# Patient Record
Sex: FEMALE | Race: WHITE | NOT HISPANIC OR LATINO | URBAN - METROPOLITAN AREA
[De-identification: names, ages, dates, MRNs, and addresses within clinical notes are randomized per-mention and may not be internally consistent; named-entity substitution may affect disease eponyms.]

---

## 2017-03-01 ENCOUNTER — OFFICE VISIT (OUTPATIENT)
Dept: PEDIATRICS | Facility: CLINIC | Age: 47
End: 2017-03-01
Payer: COMMERCIAL

## 2017-03-01 ENCOUNTER — RADIANT APPOINTMENT (OUTPATIENT)
Dept: GENERAL RADIOLOGY | Facility: CLINIC | Age: 47
End: 2017-03-01
Attending: INTERNAL MEDICINE
Payer: COMMERCIAL

## 2017-03-01 VITALS
BODY MASS INDEX: 29.38 KG/M2 | HEIGHT: 63 IN | HEART RATE: 74 BPM | OXYGEN SATURATION: 98 % | SYSTOLIC BLOOD PRESSURE: 116 MMHG | WEIGHT: 165.8 LBS | DIASTOLIC BLOOD PRESSURE: 68 MMHG | TEMPERATURE: 98.8 F

## 2017-03-01 DIAGNOSIS — M25.561 ACUTE PAIN OF RIGHT KNEE: Primary | ICD-10-CM

## 2017-03-01 PROCEDURE — 99213 OFFICE O/P EST LOW 20 MIN: CPT | Mod: GE | Performed by: INTERNAL MEDICINE

## 2017-03-01 PROCEDURE — 73562 X-RAY EXAM OF KNEE 3: CPT | Mod: RT

## 2017-03-01 NOTE — PATIENT INSTRUCTIONS
- recommend taking either Aleve 250-500 mg every 12 hours or ibuprofen 600 mg every 6 hours while awake with food  - during a pain flare up, consider taking either of the above regimens for 2-3 days scheduled, then return to an as needed basis per the directions on the bottle label  - would also be okay to use tylenol as needed (do not exceed 3,000 mg per day)  - Could also try alternating tylenol and ibuprofen throughout the day  - A typical regimen would include 600 mg ibuprofen, followed by 625 mg tylenol 3 hours later, followed by repeating the same dose of ibuprofen again 3 hours after tylenol (an so on throughout the day, without exceeding the maximum allowed per day as noted above) while awake  - example: 6AM (600 mg ibuprofen), 9AM (625 mg tylenol), 12PM (600 mg ibuprofen), 3PM (625 mg tylenol), 6PM (600 mg ibuprofen), 9PM (625 mg tylenol)   - consider using pain creams such as Icy Hot, capsaicin or Bio-freeze to the affected areas as needed  - if you do not like the texture of creams, most of these are also available in patch form  - ice and heat to the affected area as needed can also provide relief  - you were referred to sports medicine. To schedule an appointment, see below.   - call your doctor or return to clinic sooner if you symptoms worsen. Also call a doctor with new or spreading numbness/tingling on the right leg, foot drop, new redness and swelling of the knee with fever and chills.  - call with questions  Knee Pain  Knee pain is very common. It s especially common in active people who put a lot of pressure on their knees, like runners. It affects women more often than men.  Your kneecap (patella) is a thick, round bone. It covers and protects the front portion of your knee joint. It moves along a groove in your thighbone (femur) as part of the patellofemoral joint. A layer of cartilage surrounds the underside of your kneecap. This layer protects it from grinding against your femur.  When this  cartilage softens and breaks down, it can cause knee pain. This is partly because of repetitive stress. The stress irritates the lining of the joint. This causes pain in the underlying bone.  What causes knee pain?  Many things can cause knee pain. You may have more than one cause. Some of these include:    Overuse of the knee joint    The kneecap doesn t line up with the tissue around it    Damage to small nerves in the area    Damage to the ligament-like structure that holds the kneecap in place (retinaculum)    Breakdown of the bone under the cartilage    Swelling in the soft tissues around the kneecap    Injury  You might be more likely to have knee pain if you:    Exercise a lot    Recently increased the intensity of your workouts    Have a body mass index (BMI) greater than 25    Have poor alignment of your kneecap    Walk with your feet turned overly outward or inward    Have weakness in surrounding muscle groups (inner quad or hip adductor muscles)    Have too much tightness in surrounding muscle groups (hamstrings or iliotibial band)    Have a recent history of injury to the area    Are female  Symptoms of knee pain  This type of knee pain is a dull, aching pain in the front of the knee in the area under and around the kneecap. This pain may start quickly or slowly. Your pain might be worse when you squat, run, or sit for a long time. You might also sometimes feel like your knee is giving out. You may have symptoms in one or both of your knees.  Diagnosing knee pain  Your health care provider will ask about your medical history and your symptoms. Be sure to describe any activities that make your knee pain worse. He or she will look at your knee. This will include tests of your range of motion, strength, and areas of pain of your knee. Your knee alignment will be checked.  Your health care provider will need to rule out other causes of your knee pain, such as arthritis. You may need an imaging test, such as  an X-ray or MRI.  Treatment for knee pain  Treatments that can help ease your symptoms may include:    Avoiding activities for a while that make your pain worse, returning to activity over time    Icing the outside of your knee when it causes you pain    Taking over-the-counter pain medicine    Wearing a knee brace or taping your knee to support it    Wearing special shoe inserts to help keep your feet in the proper alignment    Doing special exercises to stretch and strengthen the muscles around your hip and your knee  These steps help most people manage knee pain. But some cases of knee pain need to be treated with surgery. You may need surgery right away. Or you may need it later if other treatments don t work. Your health care provider may refer you to an orthopedic surgeon. He or she will talk with you about your choices.  Preventing knee pain  Losing weight and correcting excess muscle tightness or muscle weakness may help lower your risk.  In some cases, you can prevent knee pain. To help prevent a flare-up of knee pain, you do these things:    Regularly do all the exercises your doctor or physical therapist advises    Support your knee as advised by your doctor or physical therapist    Increase training gradually, and ease up on training when needed    Have an expert check your gait for running or other sporting activities    Stretch properly before and after exercise    Replace your running shoes regularly    Lose excess weight     When to call your health care provider  Call your health care provider right away if:    Your symptoms don t get better after a few weeks of treatment    You have any new symptoms        5103-9654 [employer]. [address] [place] [address], [place] [address]. All rights reserved. This information is not intended as a substitute for professional medical care. Always follow your healthcare professional's instructions.        Reducing Knee Pain and Swelling    Many treatments  can help reduce pain and swelling in your knee. Your healthcare provider or physical therapist may suggest one or more of the following treatments:    Icing your knee helps reduce swelling. You may be asked to ice your knee once a day or more. Apply ice for about 15 to 20 minutes at a time, with at least 40 minutes between sessions. Always keep a towel between the ice and your skin.     Keeping your leg raised above your heart helps excess fluid flow out of your knee joint. This reduces swelling.    Compression means wrapping an elastic bandage or neoprene sleeve snugly around your knees. This keeps fluid from collecting in your knee joint.    Electrical stimulation, done by a physical therapist or , can help reduce excess fluid in your knee joint.    Anti-inflammatory medicines may be prescribed by your healthcare provider. You may take pills or receive injections in your knee.    Isometric (sandeep) exercises strengthen the muscles that support your knee joint. They also help reduce excess fluid in your knee.    Massage helps fluid drain away from your knee.    1144-7166 [employer]. [address] [place] [address], [place] [address]. All rights reserved. This information is not intended as a substitute for professional medical care. Always follow your healthcare professional's instructions.

## 2017-03-01 NOTE — PROGRESS NOTES
"  SUBJECTIVE:                                                    Katrin Fitzpatrick is a 46 year old female who presents to clinic today for the following health issues:    Patient presents with right knee pain. She originally injured in Nov 2016, when she tripped on a mound of snow. She had medial knee swelling at the time that mostly resolved with conservative treatment. However, in Jan 2017, she slipped on the ice while walking her dog and \"twisted her knee.\" Denies immediate joint swelling, clicking, popping or locking. She did have some delayed swelling. Describes the right knee pain as a dull throb at rest, along with an \"annoying\" pulling sensation under the knee cap and going back behind the knee. Pain is worse with prolonged sitting or standing, walking, climbing stairs, overuse or cold weather. Pain is better with ibuprofen, aleve, rest, ice, elevation. However, the pain has been persisting since, not getting any better. She now has new numbness in the right lateral shin, going down to the \"last 3 toes.\" There is also \"pressure\" beneath the knee cap. Denies joint swelling, erythema, warmth, fevers, chills, new weakness, radicular pain, foot drop, bruising. No lower extremity edema, recent travel or risks for clot. Hx of gestational diabetes. Denies hx of peripheral neuropathy.     Patient Active Problem List   Diagnosis     NO ACTIVE PROBLEMS     Encounter for supervision of other normal pregnancy     High-risk pregnancy, elderly multigravida     Abnormal maternal QUAD screen     Diabetes mellitus during pregnancy, antepartum     CARDIOVASCULAR SCREENING; LDL GOAL LESS THAN 160     Placental abnormality     Past Surgical History   Procedure Laterality Date     No history of surgery       Hc tooth extraction w/forcep       Laparoscopic hysterectomy total, salpingectomy bilateral Bilateral 12/22/2015     Procedure: LAPAROSCOPIC HYSTERECTOMY TOTAL, SALPINGECTOMY BILATERAL;  Surgeon: Mauri Resendiz MD;  " "Location: [facility] OR     Cystoscopy N/A 12/22/2015     Procedure: CYSTOSCOPY;  Surgeon: Mauri Resendiz MD;  Location: [facility] OR       Social History   Substance Use Topics     Smoking status: Never Smoker     Smokeless tobacco: Not on file     Alcohol use Yes      Comment: four drinks twice per month/ not while pregnant     Family History   Problem Relation Age of Onset     CANCER Mother      Breast melonoma and uterine     Hypertension Maternal Grandmother      Hypertension Maternal Grandfather      CEREBROVASCULAR DISEASE Paternal Grandmother      Circulatory Paternal Grandmother      abdominal aortic aneurysm     CANCER Paternal Grandfather      Colon cancer     CANCER Maternal Aunt      Bladder cancer     DIABETES Maternal Uncle      CANCER Maternal Aunt      Breast cancer     C.A.D. Maternal Aunt      C.A.D. Maternal Uncle      C.A.D. Maternal Uncle          No current outpatient prescriptions on file.     Recent Labs   Lab Test  11/30/15   1530  06/03/10   0628  06/01/10   2000 01/11/10  04/06/09   0917   A1C   --    --    --   4.6@  5.5   ALT   --   11 12   --    --    CR   --   0.61  0.62   --    --    GFRESTIMATED   --   >90  >90   --    --    GFRESTBLACK   --   >90  >90   --    --    TSH  1.44   --    --    --    --       Labs reviewed in EPIC    ROS:  Constitutional, cardiac, MSK, neuro and dermatological ROS completed, and negative aside from pertinent findings noted in HPI.    OBJECTIVE:                                                    /68 (BP Location: Right arm, Patient Position: Chair, Cuff Size: Adult Regular)  Pulse 74  Temp 98.8  F (37.1  C) (Tympanic)  Ht 5' 3\" (1.6 m)  Wt 165 lb 12.8 oz (75.2 kg)  LMP 11/29/2015 (Exact Date)  SpO2 98%  BMI 29.37 kg/m2  Body mass index is 29.37 kg/(m^2).  GENERAL: healthy, alert and no distress  EYES: Eyes grossly normal to inspection, conjunctivae and sclerae normal  RESP: lungs clear to auscultation - no rales, rhonchi or wheezes  CV: regular rate and " rhythm, normal S1 S2, no S3 or S4, no murmur, click or rub, no peripheral edema  ABDOMEN: soft and bowel sounds normal  MSK/NEURO: left knee normal. Right knee with mild erythema on lateral aspect, no erythema or warm. No joint line tenderness or effusion. Right knee with full AROM, PROM. Right patellar reflex normal. Dorsi/plantar flexion normal bilaterally. Negative anterior, posterior drawer test right knee. Negative Elsy right knee. Negative valgus, varus stress right knee. Normal strength and tone, mentation intact and speech normal. Gait normal, able to walk on toes and heels.   PSYCH: mentation appears normal, affect normal/bright  SKIN: no suspicious lesions or rashes    Imagin. Right knee 3 view  - formal read pending, no acute fracture noted, possible area of soft tissue swelling on lateral knee c/w exam     ASSESSMENT/PLAN:                                                      (M25.561) Acute pain of right knee  (primary encounter diagnosis)  Comment: Most likely right knee strain 2/2 trauma. Less likely ligamentous or meniscal tear. Very low suspicion for septic joint as there are no fevers, chills or joint line erythema, warm. There is new numbness on the lateral shin extending to toes 3,4,5, however, there is no foot drop or other appreciable weakness per hx or exam. Suspect this is due to inflammation and swelling in the area affecting local nerves vs repetitive pressure to the area, such as frequent leg crossing (pt reports this). Otherwise no red flags. Knee xray without any significant acute pathology requiring emergent intervention on personal review, formal read pending. Will treat conservatively and order PT referral.  Plan:   - XR Knee Right 3 Views, formal read pending. If significant abnormality noted, will referral to appropriate resource for treatment.  - ORTHO  REFERRAL for PT  - continue conservative treatment, including R.I.C.E.    For additional instructions, including  when to RTC, see AVS  Red flag symptoms to seek immediate medical care reviewed wit patient    Pt d/w Dr. Swanson who agrees with plan     Tian Murphy MD  PGY2 [facility]      I discussed this case in depth with Dr. Murphy and agree with the key components of the history, assessment and plan.      Tati Swanson MD  Internal Medicine/Pediatrics

## 2017-03-01 NOTE — MR AVS SNAPSHOT
After Visit Summary   3/1/2017    Katrin Fitzpatrick    MRN: [id number]           Patient Information     Date Of Birth          1970        Visit Information        Provider Department      3/1/2017 9:45 AM Renny Murphy MD [facility]        Today's Diagnoses     Acute pain of right knee    -  1      Care Instructions      - recommend taking either Aleve 250-500 mg every 12 hours or ibuprofen 600 mg every 6 hours while awake with food  - during a pain flare up, consider taking either of the above regimens for 2-3 days scheduled, then return to an as needed basis per the directions on the bottle label  - would also be okay to use tylenol as needed (do not exceed 3,000 mg per day)  - Could also try alternating tylenol and ibuprofen throughout the day  - A typical regimen would include 600 mg ibuprofen, followed by 625 mg tylenol 3 hours later, followed by repeating the same dose of ibuprofen again 3 hours after tylenol (an so on throughout the day, without exceeding the maximum allowed per day as noted above) while awake  - example: 6AM (600 mg ibuprofen), 9AM (625 mg tylenol), 12PM (600 mg ibuprofen), 3PM (625 mg tylenol), 6PM (600 mg ibuprofen), 9PM (625 mg tylenol)   - consider using pain creams such as Icy Hot, capsaicin or Bio-freeze to the affected areas as needed  - if you do not like the texture of creams, most of these are also available in patch form  - ice and heat to the affected area as needed can also provide relief  - you were referred to sports medicine. To schedule an appointment, see below.   - call your doctor or return to clinic sooner if you symptoms worsen. Also call a doctor with new or spreading numbness/tingling on the right leg, foot drop, new redness and swelling of the knee with fever and chills.  - call with questions  Knee Pain  Knee pain is very common. It s especially common in active people who put a lot of pressure on their knees, like runners. It  affects women more often than men.  Your kneecap (patella) is a thick, round bone. It covers and protects the front portion of your knee joint. It moves along a groove in your thighbone (femur) as part of the patellofemoral joint. A layer of cartilage surrounds the underside of your kneecap. This layer protects it from grinding against your femur.  When this cartilage softens and breaks down, it can cause knee pain. This is partly because of repetitive stress. The stress irritates the lining of the joint. This causes pain in the underlying bone.  What causes knee pain?  Many things can cause knee pain. You may have more than one cause. Some of these include:    Overuse of the knee joint    The kneecap doesn t line up with the tissue around it    Damage to small nerves in the area    Damage to the ligament-like structure that holds the kneecap in place (retinaculum)    Breakdown of the bone under the cartilage    Swelling in the soft tissues around the kneecap    Injury  You might be more likely to have knee pain if you:    Exercise a lot    Recently increased the intensity of your workouts    Have a body mass index (BMI) greater than 25    Have poor alignment of your kneecap    Walk with your feet turned overly outward or inward    Have weakness in surrounding muscle groups (inner quad or hip adductor muscles)    Have too much tightness in surrounding muscle groups (hamstrings or iliotibial band)    Have a recent history of injury to the area    Are female  Symptoms of knee pain  This type of knee pain is a dull, aching pain in the front of the knee in the area under and around the kneecap. This pain may start quickly or slowly. Your pain might be worse when you squat, run, or sit for a long time. You might also sometimes feel like your knee is giving out. You may have symptoms in one or both of your knees.  Diagnosing knee pain  Your health care provider will ask about your medical history and your symptoms. Be  sure to describe any activities that make your knee pain worse. He or she will look at your knee. This will include tests of your range of motion, strength, and areas of pain of your knee. Your knee alignment will be checked.  Your health care provider will need to rule out other causes of your knee pain, such as arthritis. You may need an imaging test, such as an X-ray or MRI.  Treatment for knee pain  Treatments that can help ease your symptoms may include:    Avoiding activities for a while that make your pain worse, returning to activity over time    Icing the outside of your knee when it causes you pain    Taking over-the-counter pain medicine    Wearing a knee brace or taping your knee to support it    Wearing special shoe inserts to help keep your feet in the proper alignment    Doing special exercises to stretch and strengthen the muscles around your hip and your knee  These steps help most people manage knee pain. But some cases of knee pain need to be treated with surgery. You may need surgery right away. Or you may need it later if other treatments don t work. Your health care provider may refer you to an orthopedic surgeon. He or she will talk with you about your choices.  Preventing knee pain  Losing weight and correcting excess muscle tightness or muscle weakness may help lower your risk.  In some cases, you can prevent knee pain. To help prevent a flare-up of knee pain, you do these things:    Regularly do all the exercises your doctor or physical therapist advises    Support your knee as advised by your doctor or physical therapist    Increase training gradually, and ease up on training when needed    Have an expert check your gait for running or other sporting activities    Stretch properly before and after exercise    Replace your running shoes regularly    Lose excess weight     When to call your health care provider  Call your health care provider right away if:    Your symptoms don t get better  after a few weeks of treatment    You have any new symptoms        9282-8676 The [employer] system. [address] [place] [address] 81974. All rights reserved. This information is not intended as a substitute for professional medical care. Always follow your healthcare professional's instructions.        Reducing Knee Pain and Swelling    Many treatments can help reduce pain and swelling in your knee. Your healthcare provider or physical therapist may suggest one or more of the following treatments:    Icing your knee helps reduce swelling. You may be asked to ice your knee once a day or more. Apply ice for about 15 to 20 minutes at a time, with at least 40 minutes between sessions. Always keep a towel between the ice and your skin.     Keeping your leg raised above your heart helps excess fluid flow out of your knee joint. This reduces swelling.    Compression means wrapping an elastic bandage or neoprene sleeve snugly around your knees. This keeps fluid from collecting in your knee joint.    Electrical stimulation, done by a physical therapist or , can help reduce excess fluid in your knee joint.    Anti-inflammatory medicines may be prescribed by your healthcare provider. You may take pills or receive injections in your knee.    Isometric (sandeep) exercises strengthen the muscles that support your knee joint. They also help reduce excess fluid in your knee.    Massage helps fluid drain away from your knee.    5877-2339 The [employer] system. [address] [place] [address]157. All rights reserved. This information is not intended as a substitute for professional medical care. Always follow your healthcare professional's instructions.              Follow-ups after your visit        Additional Services     ORTHO  REFERRAL       [facility] is referring you to the Orthopedic  Services at [facility].       The   Representative will assist you in the coordination of your Orthopedic and Musculoskeletal Care as prescribed by your physician.    The [facility] Representative will call you within 1 business day to help schedule your appointment, or you may contact the [facility] Representative at:    All areas ~ [phone]     Type of Referral : Non Surgical. Right knee pain.      Timeframe requested: Within 2 weeks    Coverage of these services is subject to the terms and limitations of your health insurance plan.  Please call member services at your health plan with any benefit or coverage questions.      If X-rays, CT or MRI's have been performed, please contact the facility where they were done to arrange for , prior to your scheduled appointment.  Please bring this referral request to your appointment and present it to your specialist.                  Who to contact     If you have questions or need follow up information about today's clinic visit or your schedule please contact [facility] directly at [phone].  Normal or non-critical lab and imaging results will be communicated to you by MyChart, letter or phone within 4 business days after the clinic has received the results. If you do not hear from us within 7 days, please contact the clinic through [employer] or phone. If you have a critical or abnormal lab result, we will notify you by phone as soon as possible.  Submit refill requests through [employer] or call your pharmacy and they will forward the refill request to us. Please allow 3 business days for your refill to be completed.          Additional Information About Your Visit        [employer] Information     [employer] gives you secure access to your electronic health record. If you see a primary care provider, you can also send messages to your care team and make appointments. If you have questions, please call your primary care clinic.  If you do not have a primary care provider, please call  "[phone] and they will assist you.        Care EveryWhere ID     This is your Care EveryWhere ID. This could be used by other organizations to access your [place] medical records  [id number]        Your Vitals Were     Pulse Temperature Height Last Period Pulse Oximetry BMI (Body Mass Index)    74 98.8  F (37.1  C) (Tympanic) 5' 3\" (1.6 m) 11/29/2015 (Exact Date) 98% 29.37 kg/m2       Blood Pressure from Last 3 Encounters:   03/01/17 116/68   12/27/16 118/70   02/15/16 108/70    Weight from Last 3 Encounters:   03/01/17 165 lb 12.8 oz (75.2 kg)   12/27/16 162 lb (73.5 kg)   02/15/16 157 lb 12.8 oz (71.6 kg)              We Performed the Following     ORTHO  REFERRAL     XR Knee Right 3 Views        Primary Care Provider    None       No address on file        Thank you!     Thank you for choosing [facility]  for your care. Our goal is always to provide you with excellent care. Hearing back from our patients is one way we can continue to improve our services. Please take a few minutes to complete the written survey that you may receive in the mail after your visit with us. Thank you!             Your Updated Medication List - Protect others around you: Learn how to safely use, store and throw away your medicines at www.disposemymeds.org.      Notice  As of 3/1/2017 11:03 AM    You have not been prescribed any medications.      "

## 2017-03-01 NOTE — NURSING NOTE
"Chief Complaint   Patient presents with     Musculoskeletal Problem       Initial /68 (BP Location: Right arm, Patient Position: Chair, Cuff Size: Adult Regular)  Pulse 74  Temp 98.8  F (37.1  C) (Tympanic)  Ht 5' 3\" (1.6 m)  Wt 165 lb 12.8 oz (75.2 kg)  LMP 11/29/2015 (Exact Date)  SpO2 98%  BMI 29.37 kg/m2 Estimated body mass index is 29.37 kg/(m^2) as calculated from the following:    Height as of this encounter: 5' 3\" (1.6 m).    Weight as of this encounter: 165 lb 12.8 oz (75.2 kg).  Medication Reconciliation: justin Shay LPN      "

## 2017-05-16 ENCOUNTER — TRANSFERRED RECORDS (OUTPATIENT)
Dept: HEALTH INFORMATION MANAGEMENT | Facility: CLINIC | Age: 47
End: 2017-05-16

## 2017-06-12 ENCOUNTER — MYC MEDICAL ADVICE (OUTPATIENT)
Dept: OBGYN | Facility: CLINIC | Age: 47
End: 2017-06-12

## 2017-07-03 ENCOUNTER — OFFICE VISIT (OUTPATIENT)
Dept: OBGYN | Facility: CLINIC | Age: 47
End: 2017-07-03
Payer: COMMERCIAL

## 2017-07-03 VITALS
DIASTOLIC BLOOD PRESSURE: 64 MMHG | HEART RATE: 62 BPM | BODY MASS INDEX: 28.42 KG/M2 | SYSTOLIC BLOOD PRESSURE: 96 MMHG | WEIGHT: 160.4 LBS | HEIGHT: 63 IN

## 2017-07-03 DIAGNOSIS — Z00.00 ROUTINE HISTORY AND PHYSICAL EXAMINATION OF ADULT: Primary | ICD-10-CM

## 2017-07-03 LAB
CHOLEST SERPL-MCNC: 207 MG/DL
GLUCOSE SERPL-MCNC: 84 MG/DL (ref 70–99)
HBA1C MFR BLD: 4.8 % (ref 4.3–6)
HDLC SERPL-MCNC: 54 MG/DL
LDLC SERPL CALC-MCNC: 134 MG/DL
NONHDLC SERPL-MCNC: 153 MG/DL
TRIGL SERPL-MCNC: 93 MG/DL

## 2017-07-03 PROCEDURE — 83036 HEMOGLOBIN GLYCOSYLATED A1C: CPT | Performed by: OBSTETRICS & GYNECOLOGY

## 2017-07-03 PROCEDURE — 80061 LIPID PANEL: CPT | Performed by: OBSTETRICS & GYNECOLOGY

## 2017-07-03 PROCEDURE — 36415 COLL VENOUS BLD VENIPUNCTURE: CPT | Performed by: OBSTETRICS & GYNECOLOGY

## 2017-07-03 PROCEDURE — 99396 PREV VISIT EST AGE 40-64: CPT | Performed by: OBSTETRICS & GYNECOLOGY

## 2017-07-03 PROCEDURE — 82947 ASSAY GLUCOSE BLOOD QUANT: CPT | Performed by: OBSTETRICS & GYNECOLOGY

## 2017-07-03 NOTE — NURSING NOTE
"Chief Complaint   Patient presents with     Gyn Exam       Initial BP 96/64  Pulse 62  Ht 5' 3\" (1.6 m)  Wt 160 lb 6.4 oz (72.8 kg)  LMP 2015 (Exact Date)  BMI 28.41 kg/m2 Estimated body mass index is 28.41 kg/(m^2) as calculated from the following:    Height as of this encounter: 5' 3\" (1.6 m).    Weight as of this encounter: 160 lb 6.4 oz (72.8 kg).  BP completed using cuff size: regular        The following HM Due: NONE      The following patient reported/Care Every where data was sent to:  P ABSTRACT QUALITY INITIATIVES [00868]  NA     patient has appointment for today    Vibha ERICKSON               "

## 2017-07-03 NOTE — PROGRESS NOTES
"SUBJECTIVE:  Katrin Fitzpatrick is a 45 year old   ,  female, G:3 P3 woman who presents for annual exam. Patient's last menstrual period was 11/29/2015 (exact date).   Total Laparoscopic hysterectomy    Current contraception: ESSURE  History of abnormal Pap smear: No  Regular self breast exam: Yes  Family history of breast cancer: Yes - Mother. Colon cancer Yes - Uncle at age 50. Ovarian cancer No.  History of abnormal lipids: No        Past Medical History:   Diagnosis Date     Gestational diabetes      Irregular heart beat      NONSPECIFIC MEDICAL HISTORY        Past Surgical History:   Procedure Laterality Date     CYSTOSCOPY N/A 12/22/2015    Procedure: CYSTOSCOPY;  Surgeon: Mauri Resendiz MD;  Location: [facility] OR     HC TOOTH EXTRACTION W/FORCEP       LAPAROSCOPIC HYSTERECTOMY TOTAL, SALPINGECTOMY BILATERAL Bilateral 12/22/2015    Procedure: LAPAROSCOPIC HYSTERECTOMY TOTAL, SALPINGECTOMY BILATERAL;  Surgeon: Mauri Resendiz MD;  Location: [facility] OR     NO HISTORY OF SURGERY         No current outpatient prescriptions on file.     No current facility-administered medications for this visit.      Allergies   Allergen Reactions     Biaxin [Clarithromycin] Nausea and Vomiting     Humulin N [Insulin, Isophane Human, Nph] Itching       Social History   Substance Use Topics     Smoking status: Never Smoker     Smokeless tobacco: Not on file     Alcohol use Yes      Comment: four drinks twice per month/ not while pregnant       Review of Systems    CONSTITUTIONAL:NEGATIVE  EYES: NEGATIVE  ENT/MOUTH: NEGATIVE  RESP: NEGATIVE  CV: NEGATIVE  GI: NEGATIVE  : NEGATIVE  MUSCULOSKELATAL: NEGATIVE  INTEGUMENTARY/SKIN: NEGATIVE  BREAST: NEGATIVE  NEURO: NEGATIVE.      OBJECTIVE:  BP 96/64  Pulse 62  Ht 5' 3\" (1.6 m)  Wt 160 lb 6.4 oz (72.8 kg)  LMP 11/29/2015 (Exact Date)  BMI 28.41 kg/m2  General appearance: Healthy.  Skin: Normal.  Mental Status: cooperative, normal affect, no gross thought process " defects.  Thyroid: Normal to palpation, no enlargement or nodules noted.  Breasts: Symmetric without mass tenderness or discharge.  Axillary nodes negative.  Lungs: Clear to auscultation.   Heart.: Normal rate and rhythm.  No murmurs, clicks or gallops.   Abdomen: BS active. Soft, non-tender, no masses or organomegaly.    Pelvis: normal external genitalia, normal groin lymphatics, normal urethral meatus, normal vaginal mucosa and normal adnexa, no masses or tenderness  Extremities: Normal     ASSESSMENT:  Satisfactory annual gyn exam    PLAN:    1) Pap smear  2) Mammography, lipids at appropriate intervals        PE: reviewed health maintenance including diet, regular exercise and periodic exams.

## 2017-07-03 NOTE — MR AVS SNAPSHOT
"              After Visit Summary   7/3/2017    Katrin Fitzpatrick    MRN: [id number]           Patient Information     Date Of Birth          1970        Visit Information        Provider Department      7/3/2017 2:30 PM Mauri Resendiz MD [facility]        Today's Diagnoses     Routine history and physical examination of adult    -  1       Follow-ups after your visit        Who to contact     If you have questions or need follow up information about today's clinic visit or your schedule please contact [facility] directly at [phone].  Normal or non-critical lab and imaging results will be communicated to you by [employer], letter or phone within 4 business days after the clinic has received the results. If you do not hear from us within 7 days, please contact the clinic through [employer] or phone. If you have a critical or abnormal lab result, we will notify you by phone as soon as possible.  Submit refill requests through [employer] or call your pharmacy and they will forward the refill request to us. Please allow 3 business days for your refill to be completed.          Additional Information About Your Visit        MyChart Information     [employer] gives you secure access to your electronic health record. If you see a primary care provider, you can also send messages to your care team and make appointments. If you have questions, please call your primary care clinic.  If you do not have a primary care provider, please call [phone] and they will assist you.        Care EveryWhere ID     This is your Care EveryWhere ID. This could be used by other organizations to access your [place] medical records  [id number]        Your Vitals Were     Pulse Height Last Period BMI (Body Mass Index)          62 5' 3\" (1.6 m) 11/29/2015 (Exact Date) 28.41 kg/m2         Blood Pressure from Last 3 Encounters:   07/03/17 96/64   06/07/17 132/82   03/01/17 116/68    Weight from Last 3 " Encounters:   07/03/17 160 lb 6.4 oz (72.8 kg)   06/07/17 165 lb (74.8 kg)   03/01/17 165 lb 12.8 oz (75.2 kg)              Today, you had the following     No orders found for display       Primary Care Provider    None       No address on file        Equal Access to Services     NORMADARRYL HUNTERDORITA : Hadii mary ku hadasho Somendozaali, waaxda luqadaha, qaybta kaalmada bobbi, jody kothari. So [facility] [phone].    ATENCIÓN: Si habla español, tiene a bentley disposición servicios gratuitos de asistencia lingüística. Llame al [phone].    We comply with applicable federal civil rights laws and [place] laws. We do not discriminate on the basis of race, color, national origin, age, disability sex, sexual orientation or gender identity.            Thank you!     Thank you for choosing [facility]  for your care. Our goal is always to provide you with excellent care. Hearing back from our patients is one way we can continue to improve our services. Please take a few minutes to complete the written survey that you may receive in the mail after your visit with us. Thank you!             Your Updated Medication List - Protect others around you: Learn how to safely use, store and throw away your medicines at www.disposemymeds.org.      Notice  As of 7/3/2017  2:58 PM    You have not been prescribed any medications.

## 2017-09-11 ENCOUNTER — OFFICE VISIT (OUTPATIENT)
Dept: PEDIATRICS | Facility: CLINIC | Age: 47
End: 2017-09-11
Payer: COMMERCIAL

## 2017-09-11 ENCOUNTER — RADIANT APPOINTMENT (OUTPATIENT)
Dept: GENERAL RADIOLOGY | Facility: CLINIC | Age: 47
End: 2017-09-11
Attending: PHYSICIAN ASSISTANT
Payer: COMMERCIAL

## 2017-09-11 VITALS
HEIGHT: 63 IN | BODY MASS INDEX: 27.45 KG/M2 | WEIGHT: 154.9 LBS | HEART RATE: 71 BPM | TEMPERATURE: 98.9 F | OXYGEN SATURATION: 98 % | DIASTOLIC BLOOD PRESSURE: 60 MMHG | SYSTOLIC BLOOD PRESSURE: 100 MMHG | RESPIRATION RATE: 14 BRPM

## 2017-09-11 DIAGNOSIS — M25.531 RIGHT WRIST PAIN: ICD-10-CM

## 2017-09-11 DIAGNOSIS — S63.501A WRIST SPRAIN, RIGHT, INITIAL ENCOUNTER: Primary | ICD-10-CM

## 2017-09-11 PROCEDURE — 73110 X-RAY EXAM OF WRIST: CPT | Mod: RT

## 2017-09-11 PROCEDURE — 99213 OFFICE O/P EST LOW 20 MIN: CPT | Performed by: PHYSICIAN ASSISTANT

## 2017-09-11 NOTE — NURSING NOTE
"Chief Complaint   Patient presents with     Musculoskeletal Problem       Initial /60 (BP Location: Right arm, Patient Position: Chair, Cuff Size: Adult Regular)  Pulse 71  Temp 98.9  F (37.2  C) (Oral)  Resp 14  Ht 5' 3\" (1.6 m)  Wt 154 lb 14.4 oz (70.3 kg)  LMP 11/29/2015 (Exact Date)  SpO2 98%  BMI 27.44 kg/m2 Estimated body mass index is 27.44 kg/(m^2) as calculated from the following:    Height as of this encounter: 5' 3\" (1.6 m).    Weight as of this encounter: 154 lb 14.4 oz (70.3 kg).  Medication Reconciliation: complete   Azalea Griffin CMA (AAMA)    "

## 2017-09-11 NOTE — PROGRESS NOTES
"  SUBJECTIVE:   Ktarin Fitzpatrick is a 47 year old female who presents to clinic today for the following health issues:    Joint Pain    Onset: ongoing x one year; worsened considerably over the past three days; worse today    Description:   Location: right wrist  Character: Sharp, Stabbing and throbbing    Intensity: severe    Progression of Symptoms: worse    Accompanying Signs & Symptoms:  Other symptoms: swelling; no redness or bruising  Numbness and tingling   Localized over dorsal wrist and now over the palmar thumb     History:   Previous similar pain: YES    Precipitating factors:   Trauma or overuse: no     Alleviating factors:  Improved by: nothing  Therapies Tried and outcome: ace wraps, ice and ibuprofen 800 mg     Right hand dominant.   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     ROS:  ROS otherwise negative    OBJECTIVE:                                                    /60 (BP Location: Right arm, Patient Position: Chair, Cuff Size: Adult Regular)  Pulse 71  Temp 98.9  F (37.2  C) (Oral)  Resp 14  Ht 5' 3\" (1.6 m)  Wt 154 lb 14.4 oz (70.3 kg)  LMP 11/29/2015 (Exact Date)  SpO2 98%  BMI 27.44 kg/m2  Body mass index is 27.44 kg/(m^2).   GENERAL: alert, no distress  Wrist Exam: WRIST: RIGHT  Inspection: swelling over the dorsal wrist  Palpation: Tender: over the ulnar styloid and mid dorsal wrist  Range of Motion: flexion:  decreased, painful, extension:  decreased, painful, Pronation/Supination: painful and unable to fully complete; radial deviation: unable, ulnar deviation: unable  Strength: unable to fully test due to pain  Special tests: negative Tinel's at carpal tunnel.  , negative Phalen's.      ELBOW:  Unable to fully test due to wrist pain    Diagnostic test results:  Right wrist xrays appears NEGATIVE. Radiology review pending.  No results found for this or any previous visit (from the past 24 hour(s)).       ASSESSMENT/PLAN:                                                    (M07.252L) Wrist sprain, right, " initial encounter  (primary encounter diagnosis)  Comment: wear brace, RICE. Follow up with ortho.   Plan: XR Wrist Right G/E 3 Views, ORTHO          REFERRAL, order for DME          See Patient Instructions    Cammie Knight PA-C  [facility]

## 2017-09-11 NOTE — MR AVS SNAPSHOT
After Visit Summary   9/11/2017    Katrin Fitzpatrick    MRN: [id number]           Patient Information     Date Of Birth          1970        Visit Information        Provider Department      9/11/2017 1:30 PM Cammie Knight PA-C [facility]        Today's Diagnoses     Right wrist pain    -  1      Care Instructions                     Wrist Sprain            What is a wrist sprain?   A sprain is an injury to a joint that causes a stretch or tear in a ligament. Ligaments are strong bands of tissue that connect one bone to another. Your wrist is made up of 8 bones that are attached to your hand bones and the bones of your forearm. The wrist joint is covered by a joint capsule and the bones are connected by ligaments.   How does it occur?   A wrist sprain can happen when you fall on your wrist or hand, when you are struck by an object, or during a forced motion of the wrist.   What are the symptoms?   You have pain, swelling, and tenderness in your wrist.   How is it diagnosed?   Your healthcare provider will review your symptoms and examine your wrist. You may have an X-ray to be sure you have not broken any bones in your wrist.   How is it treated?   To treat this condition:   Put an ice pack, gel pack, or package of frozen vegetables, wrapped in a cloth on the wrist every 3 to 4 hours, for up to 20 minutes at a time.   Raise your wrist on a pillow when you sit or lie down.   Take an anti-inflammatory such as ibuprofen, or other medicine as directed by your provider. Nonsteroidal anti-inflammatory medicines (NSAIDs) may cause stomach bleeding and other problems. These risks increase with age. Read the label and take as directed. Unless recommended by your healthcare provider, do not take for more than 10 days.   Wear a splint or cast on your wrist as directed by your provider.   Follow your provider's instructions for doing exercises to help you recover.   Some serious wrist  sprains that involve ligament tears may need surgery.   While you are recovering from your injury you will need to change your sport or activity to one that does not make your condition worse. For example, you may need to run instead of playing basketball.   How long will the effects last?   The length of recovery depends on many factors such as your age and health, and if you have had a previous wrist injury. Recovery time also depends on the severity of the wrist sprain. Pain from a wrist sprain may last several weeks or longer. You need to stop doing the activities that cause pain until your wrist has improved. If you continue doing activities that cause pain, your symptoms will return and it will take longer to recover.   When can I return to my normal activities?   Everyone recovers from an injury at a different rate. Return to your activities depends on how soon your wrist recovers, not by how many days or weeks it has been since your injury has occurred. In general, the longer you have symptoms before you start treatment, the longer it will take to get better. The goal of rehabilitation is to return you to your normal activities as soon as is safely possible. If you return too soon you may worsen your injury.   You may return to your activities when the injured wrist can move normally without pain. Your injured wrist, hand, and forearm need to have the same strength as the uninjured side.   How can I prevent a wrist sprain?   A wrist sprain usually occurs during an accident that is not preventable. However, when you are doing activities such as rollerblading be sure to wear protective wrist guards.           Follow-ups after your visit        Additional Services     ORTHO [facility] REFERRAL       [facility] is referring you to the Orthopedic  Services at [facility].       The  Representative will assist you in the coordination of your Orthopedic and  Musculoskeletal Care as prescribed by your physician.    The  Representative will call you within 1 business day to help schedule your appointment, or you may contact the  Representative at:    All areas ~ [phone]     Type of Referral : Non Surgical       Timeframe requested: 3 - 5 days    Coverage of these services is subject to the terms and limitations of your health insurance plan.  Please call member services at your health plan with any benefit or coverage questions.      If X-rays, CT or MRI's have been performed, please contact the facility where they were done to arrange for , prior to your scheduled appointment.  Please bring this referral request to your appointment and present it to your specialist.                  Who to contact     If you have questions or need follow up information about today's clinic visit or your schedule please contact [facility] directly at [phone].  Normal or non-critical lab and imaging results will be communicated to you by MyChart, letter or phone within 4 business days after the clinic has received the results. If you do not hear from us within 7 days, please contact the clinic through [employer] or phone. If you have a critical or abnormal lab result, we will notify you by phone as soon as possible.  Submit refill requests through [employer] or call your pharmacy and they will forward the refill request to us. Please allow 3 business days for your refill to be completed.          Additional Information About Your Visit        [employer] Information     [employer] gives you secure access to your electronic health record. If you see a primary care provider, you can also send messages to your care team and make appointments. If you have questions, please call your primary care clinic.  If you do not have a primary care provider, please call [phone] and they will assist you.        Care EveryWhere ID     This is your Care EveryWhere  "ID. This could be used by other organizations to access your [place] medical records  [id number]        Your Vitals Were     Pulse Temperature Respirations Height Last Period Pulse Oximetry    71 98.9  F (37.2  C) (Oral) 14 5' 3\" (1.6 m) 11/29/2015 (Exact Date) 98%    BMI (Body Mass Index)                   27.44 kg/m2            Blood Pressure from Last 3 Encounters:   09/11/17 100/60   07/03/17 96/64   06/07/17 132/82    Weight from Last 3 Encounters:   09/11/17 154 lb 14.4 oz (70.3 kg)   07/03/17 160 lb 6.4 oz (72.8 kg)   06/07/17 165 lb (74.8 kg)              We Performed the Following     ORTHO  REFERRAL          Today's Medication Changes          These changes are accurate as of: 9/11/17  2:05 PM.  If you have any questions, ask your nurse or doctor.               Start taking these medicines.        Dose/Directions    order for DME   Used for:  Right wrist pain   Started by:  Cammie Knight PA-C        Equipment being ordered: wrist brace   Quantity:  1 Units   Refills:  0            Where to get your medicines      Some of these will need a paper prescription and others can be bought over the counter.  Ask your nurse if you have questions.     Bring a paper prescription for each of these medications     order for DME                Primary Care Provider Office Phone # Fax #    Mauri Resendiz [phone]       [address] [facility] [address]        Equal Access to Services     DARRYL FULTON AH: Hadii mary ku hadasho Soomaali, waaxda luqadaha, qaybta kaalmada adeegyada, waxjanki dung alfredo . So [facility] [phone].    ATENCIÓN: Si habla español, tiene a bentley disposición servicios gratuitos de asistencia lingüística. Llame al [phone].    We comply with applicable federal civil rights laws and [place] laws. We do not discriminate on the basis of race, color, national origin, age, disability sex, sexual orientation or gender " identity.            Thank you!     Thank you for choosing [facility]  for your care. Our goal is always to provide you with excellent care. Hearing back from our patients is one way we can continue to improve our services. Please take a few minutes to complete the written survey that you may receive in the mail after your visit with us. Thank you!             Your Updated Medication List - Protect others around you: Learn how to safely use, store and throw away your medicines at www.disposemymeds.org.          This list is accurate as of: 9/11/17  2:05 PM.  Always use your most recent med list.                   Brand Name Dispense Instructions for use Diagnosis    order for DME     1 Units    Equipment being ordered: wrist brace    Right wrist pain

## 2017-09-11 NOTE — PATIENT INSTRUCTIONS
Wrist Sprain            What is a wrist sprain?   A sprain is an injury to a joint that causes a stretch or tear in a ligament. Ligaments are strong bands of tissue that connect one bone to another. Your wrist is made up of 8 bones that are attached to your hand bones and the bones of your forearm. The wrist joint is covered by a joint capsule and the bones are connected by ligaments.   How does it occur?   A wrist sprain can happen when you fall on your wrist or hand, when you are struck by an object, or during a forced motion of the wrist.   What are the symptoms?   You have pain, swelling, and tenderness in your wrist.   How is it diagnosed?   Your healthcare provider will review your symptoms and examine your wrist. You may have an X-ray to be sure you have not broken any bones in your wrist.   How is it treated?   To treat this condition:   Put an ice pack, gel pack, or package of frozen vegetables, wrapped in a cloth on the wrist every 3 to 4 hours, for up to 20 minutes at a time.   Raise your wrist on a pillow when you sit or lie down.   Take an anti-inflammatory such as ibuprofen, or other medicine as directed by your provider. Nonsteroidal anti-inflammatory medicines (NSAIDs) may cause stomach bleeding and other problems. These risks increase with age. Read the label and take as directed. Unless recommended by your healthcare provider, do not take for more than 10 days.   Wear a splint or cast on your wrist as directed by your provider.   Follow your provider's instructions for doing exercises to help you recover.   Some serious wrist sprains that involve ligament tears may need surgery.   While you are recovering from your injury you will need to change your sport or activity to one that does not make your condition worse. For example, you may need to run instead of playing basketball.   How long will the effects last?   The length of recovery depends on many factors such as your age and  health, and if you have had a previous wrist injury. Recovery time also depends on the severity of the wrist sprain. Pain from a wrist sprain may last several weeks or longer. You need to stop doing the activities that cause pain until your wrist has improved. If you continue doing activities that cause pain, your symptoms will return and it will take longer to recover.   When can I return to my normal activities?   Everyone recovers from an injury at a different rate. Return to your activities depends on how soon your wrist recovers, not by how many days or weeks it has been since your injury has occurred. In general, the longer you have symptoms before you start treatment, the longer it will take to get better. The goal of rehabilitation is to return you to your normal activities as soon as is safely possible. If you return too soon you may worsen your injury.   You may return to your activities when the injured wrist can move normally without pain. Your injured wrist, hand, and forearm need to have the same strength as the uninjured side.   How can I prevent a wrist sprain?   A wrist sprain usually occurs during an accident that is not preventable. However, when you are doing activities such as rollerblading be sure to wear protective wrist guards.

## 2017-09-14 ENCOUNTER — OFFICE VISIT (OUTPATIENT)
Dept: ORTHOPEDICS | Facility: CLINIC | Age: 47
End: 2017-09-14
Payer: COMMERCIAL

## 2017-09-14 VITALS
DIASTOLIC BLOOD PRESSURE: 78 MMHG | SYSTOLIC BLOOD PRESSURE: 120 MMHG | BODY MASS INDEX: 27.29 KG/M2 | HEIGHT: 63 IN | WEIGHT: 154 LBS

## 2017-09-14 DIAGNOSIS — M77.8 RIGHT WRIST TENDINITIS: Primary | ICD-10-CM

## 2017-09-14 PROCEDURE — 99243 OFF/OP CNSLTJ NEW/EST LOW 30: CPT | Performed by: FAMILY MEDICINE

## 2017-09-14 NOTE — PROGRESS NOTES
"ASSESSMENT & PLAN    ICD-10-CM    1. Right wrist tendinitis M77.8 [facility] PT, HAND, AND CHIROPRACTIC REFERRAL   Hand therapy: [place] [facility] - [phone]  Continue with bracing for now    Follow up if not improved after 4-6 hand therapy sessions  or sooner if needed. Call direct clinic number [[phone]] at any time with questions or concerns.    -----    SUBJECTIVE  Katrin Fitzpatrick is a/an 47 year old  right hand dominant female who is seen in consultation at the request of Cammie RAMIREZ for evaluation of right dorsal wrist pain. The patient is seen with their .    Onset: 9/9/17 increased pain, has been bothersome for over a year. Reports insidious onset without acute precipitating event. She reports that on 9/11/17 she was in a lot of pain and had trouble with motion. She repors today that she is much improved.  Worsened by: rotation, wrist flexion  Better with: wrist brace  Quality: pulling sensation  Pain Scale (maximum/current)/10: 8/10 / 0/10  Treatments tried: ibuprofen and cock up wrist brace - no tneeded since 9/11/17  Orthopedic history: YES - Date: 3rd grade fractured wrist  Relevant surgical history: NO  Patient Social History: works at nurse    Patient's past medical, surgical, social, and family histories were reviewed today and no changes are noted.    REVIEW OF SYSTEMS:  10 point ROS is negative other than symptoms noted above in HPI, Past Medical History or as stated below  Constitutional: NEGATIVE for fever, chills, change in weight  Skin: NEGATIVE for worrisome rashes, moles or lesions  GI/: NEGATIVE for bowel or bladder changes  Neuro: NEGATIVE for weakness, dizziness or paresthesias    OBJECTIVE:  /78  Ht 5' 3\" (1.6 m)  Wt 154 lb (69.9 kg)  LMP 11/29/2015 (Exact Date)  BMI 27.28 kg/m2   General: healthy, alert and in no distress  HEENT: no scleral icterus or conjunctival erythema  Skin: no suspicious lesions or rash. No jaundice.  CV: regular rhythm " by palpation  Resp: normal respiratory effort without conversational dyspnea   Psych: normal mood and affect  Gait: normal steady gait with appropriate coordination and balance  Neuro: Normal sensory exam of bilateral hands. Normal 2 pt discrimination.   MSK:  RIGHT WRIST  Inspection:   Trace swelling ulnar/dorsal wrist  Palpation:    Tender about the ECU > TFCC. Remainder of bony and ligamentous line marks are nontender.   Range of Motion:    Full in all planes, mildly painful with ulnar deviation  Strength:     strength full flexion full extension full radial deviation full ulnar deviation limited by pain. Normal pinch strength.  Special Tests:    Positive: painful with testing of ECU,     Negative: nontender with TFCC grind    Independent visualization of the below image:   RIGHT WRIST 3 VIEWS   9/11/2017 1:54 PM      HISTORY: Right wrist pain.     COMPARISON: None.         IMPRESSION: Unremarkable examination. No evidence for acute fracture  or dislocation in the right wrist.     GIDEON ARIAS MD      Patient's conditions were thoroughly discussed during today's visit with greater than 50% of the visit spent counseling the patient with total time spent face-to-face with the patient being 15 minutes.    Altaf Purvis DO [facility] [place]

## 2017-09-14 NOTE — PATIENT INSTRUCTIONS
Thank you for allowing us to participate in your care today.  Please find below your visit diagnosis and the plan going forward.    1. Right wrist tendinitis      Hand therapy: [place] [facility] - [phone]  Continue with bracing for now    Follow up if not improved after 4-6 hand therapy sessions  or sooner if needed. Call direct clinic number [[phone]] at any time with questions or concerns.    Altaf Purvis DO CAM  [facility]  Website: [email or web address]  Twitter: [email or web address]

## 2017-09-14 NOTE — NURSING NOTE
"Chief Complaint   Patient presents with     Musculoskeletal Problem     right wrist pain       Initial /78  Ht 5' 3\" (1.6 m)  Wt 154 lb (69.9 kg)  LMP 11/29/2015 (Exact Date)  BMI 27.28 kg/m2 Estimated body mass index is 27.28 kg/(m^2) as calculated from the following:    Height as of this encounter: 5' 3\" (1.6 m).    Weight as of this encounter: 154 lb (69.9 kg).  Medication Reconciliation: complete     Aditya Chan, ATC    "

## 2017-09-14 NOTE — MR AVS SNAPSHOT
After Visit Summary   9/14/2017    Katrin Fitzpatrick    MRN: [id number]           Patient Information     Date Of Birth          1970        Visit Information        Provider Department      9/14/2017 11:00 AM Altaf Purvis DO [facility]        Today's Diagnoses     Right wrist tendinitis    -  1      Care Instructions    Thank you for allowing us to participate in your care today.  Please find below your visit diagnosis and the plan going forward.    1. Right wrist tendinitis      Hand therapy: [place] [facility] - [phone]  Continue with bracing for now    Follow up if not improved after 4-6 hand therapy sessions  or sooner if needed. Call direct clinic number [[phone]] at any time with questions or concerns.    Altaf Purvis DO [facility]  Website: [email or web address]  Twitter: [email or web address]            Follow-ups after your visit        Additional Services     [facility] PT, HAND, AND CHIROPRACTIC REFERRAL       **This order will print in the [facility] Scheduling Office**    Physical Therapy, Hand Therapy and Chiropractic Care are available through:    *[place] [facility]  *[facility]  *[facility]    Call one number to schedule at any of the above locations: [phone].    Your provider has referred you to: Hand Therapy    Indication/Reason for Referral: Ulnar sided wrist pain (ECU/TFCC)  Onset of Illness: see chart  Therapy Orders: Evaluate and Treat  Special Programs: None  Special Request: Please provide custom orthosis as appropriate    Additional Comments for the Therapist or Chiropractor:     Please be aware that coverage of these services is subject to the terms and limitations of your health insurance plan.  Call member services at your health plan with any benefit or coverage questions.      Please bring the following to your appointment:    *Your personal  "calendar for scheduling future appointments  *Comfortable clothing                  Who to contact     If you have questions or need follow up information about today's clinic visit or your schedule please contact [facility] directly at [phone].  Normal or non-critical lab and imaging results will be communicated to you by MyChart, letter or phone within 4 business days after the clinic has received the results. If you do not hear from us within 7 days, please contact the clinic through MyChart or phone. If you have a critical or abnormal lab result, we will notify you by phone as soon as possible.  Submit refill requests through [employer] or call your pharmacy and they will forward the refill request to us. Please allow 3 business days for your refill to be completed.          Additional Information About Your Visit        [employer] Information     [employer] gives you secure access to your electronic health record. If you see a primary care provider, you can also send messages to your care team and make appointments. If you have questions, please call your primary care clinic.  If you do not have a primary care provider, please call [phone] and they will assist you.        Care EveryWhere ID     This is your Care EveryWhere ID. This could be used by other organizations to access your [facility] medical records  [id number]        Your Vitals Were     Height Last Period BMI (Body Mass Index)             5' 3\" (1.6 m) 11/29/2015 (Exact Date) 27.28 kg/m2          Blood Pressure from Last 3 Encounters:   09/14/17 120/78   09/11/17 100/60   07/03/17 96/64    Weight from Last 3 Encounters:   09/14/17 154 lb (69.9 kg)   09/11/17 154 lb 14.4 oz (70.3 kg)   07/03/17 160 lb 6.4 oz (72.8 kg)              We Performed the Following     [facility] PT, HAND, AND CHIROPRACTIC REFERRAL        Primary Care Provider Office Phone # Fax #    Mauri Resendiz [phone]       [address] " [facility] [address]        Equal Access to Services     [facility] : Hadii aad ku hadkjstella Emileeali, waessieda luqadaha, qaybta brysonjuanjody barrarelisingrid kothari. So [facility] [phone].    ATENCIÓN: Si habla español, tiene a bentley disposición servicios gratuitos de asistencia lingüística. Llame al [phone].    We comply with applicable federal civil rights laws and [place] laws. We do not discriminate on the basis of race, color, national origin, age, disability sex, sexual orientation or gender identity.            Thank you!     Thank you for choosing [facility]  for your care. Our goal is always to provide you with excellent care. Hearing back from our patients is one way we can continue to improve our services. Please take a few minutes to complete the written survey that you may receive in the mail after your visit with us. Thank you!             Your Updated Medication List - Protect others around you: Learn how to safely use, store and throw away your medicines at www.disposemymeds.org.          This list is accurate as of: 9/14/17 11:28 AM.  Always use your most recent med list.                   Brand Name Dispense Instructions for use Diagnosis    order for DME     1 Units    Equipment being ordered: wrist brace    Wrist sprain, right, initial encounter

## 2017-09-21 ENCOUNTER — THERAPY VISIT (OUTPATIENT)
Dept: OCCUPATIONAL THERAPY | Facility: CLINIC | Age: 47
End: 2017-09-21
Payer: COMMERCIAL

## 2017-09-21 DIAGNOSIS — M77.8 TENDONITIS OF WRIST, RIGHT: ICD-10-CM

## 2017-09-21 DIAGNOSIS — M25.531 RIGHT WRIST PAIN: Primary | ICD-10-CM

## 2017-09-21 PROCEDURE — 97165 OT EVAL LOW COMPLEX 30 MIN: CPT | Mod: GO | Performed by: OCCUPATIONAL THERAPIST

## 2017-09-21 PROCEDURE — 29125 APPL SHORT ARM SPLINT STATIC: CPT | Mod: GO | Performed by: OCCUPATIONAL THERAPIST

## 2017-09-21 PROCEDURE — 97110 THERAPEUTIC EXERCISES: CPT | Mod: GO | Performed by: OCCUPATIONAL THERAPIST

## 2017-09-21 NOTE — MR AVS SNAPSHOT
After Visit Summary   9/21/2017    Katrin Fitzpatrick    MRN: [id number]           Patient Information     Date Of Birth          1970        Visit Information        Provider Department      9/21/2017 11:00 AM Stacie Browning, OT [facility]        Today's Diagnoses     Right wrist pain    -  1    Tendonitis of wrist, right           Follow-ups after your visit        Your next 10 appointments already scheduled     Sep 28, 2017 11:30 AM CDT   [facility] with Stacie Browning, OT   [facility] ([facility]  )    [id number] [place] [address]   [phone]            Oct 05, 2017 11:30 AM CDT   [facility] with Stacie Heelvakes, OT   [facility] ([facility]  )    [address] [place] [address]   [phone]            Oct 12, 2017 11:30 AM CDT   [facility] with Stacie Mensahkes, OT   [facility] ([facility]  )    [address] [place] [address]   [phone]            Oct 19, 2017  8:30 AM CDT   [facility] with Stacie Mensahkes, OT   [facility] ([facility]  )    [address] [place] [address]   [phone]              Who to contact     If you have questions or need follow up information about today's clinic visit or your schedule please contact [facility] directly at [phone].  Normal or non-critical lab and imaging results will be communicated to you by MyChart, letter or phone within 4 business days after the clinic has received the results. If you do not hear from us within 7 days, please contact the clinic through [employer] or phone. If you have a critical or abnormal lab result, we will notify you by phone as soon as possible.  Submit refill requests through [employer] or call your pharmacy and they will forward the refill request to us. Please allow 3 business days for your refill to be completed.          Additional Information About  Your Visit        [employer] Information     [employer] gives you secure access to your electronic health record. If you see a primary care provider, you can also send messages to your care team and make appointments. If you have questions, please call your primary care clinic.  If you do not have a primary care provider, please call [phone] and they will assist you.        Care EveryWhere ID     This is your Care EveryWhere ID. This could be used by other organizations to access your [facility] medical records  [id number]        Your Vitals Were     Last Period                   11/29/2015 (Exact Date)            Blood Pressure from Last 3 Encounters:   09/14/17 120/78   09/11/17 100/60   07/03/17 96/64    Weight from Last 3 Encounters:   09/14/17 69.9 kg (154 lb)   09/11/17 70.3 kg (154 lb 14.4 oz)   07/03/17 72.8 kg (160 lb 6.4 oz)              We Performed the Following     APPLY SHORT ARM SPLINT STATIC     HC OT EVAL, LOW COMPLEXITY     [facility] INITIAL EVAL REPORT     THERAPEUTIC EXERCISES        Primary Care Provider Office Phone # Fax #    Mauri Resendiz [phone]       [address] [facility] [address]        Equal Access to Services     YVONNE FULTON : Hadii aad ku hadasho Soomaali, waaxda luqadaha, qaybta kaalmada adeegyada, jody medinain hayrosangelan medardo kothari. So [facility] [phone].    ATENCIÓN: Si habla español, tiene a bentley disposición servicios gratuitos de asistencia lingüística. Llame al [phone].    We comply with applicable federal civil rights laws and [place] laws. We do not discriminate on the basis of race, color, national origin, age, disability sex, sexual orientation or gender identity.            Thank you!     Thank you for choosing [facility]  for your care. Our goal is always to provide you with excellent care. Hearing back from our patients is one way we can continue to improve our services. Please take a few minutes to complete the written  survey that you may receive in the mail after your visit with us. Thank you!             Your Updated Medication List - Protect others around you: Learn how to safely use, store and throw away your medicines at www.disposemymeds.org.          This list is accurate as of: 9/21/17  3:21 PM.  Always use your most recent med list.                   Brand Name Dispense Instructions for use Diagnosis    order for DME     1 Units    Equipment being ordered: wrist brace    Wrist sprain, right, initial encounter

## 2017-09-21 NOTE — PROGRESS NOTES
Hand Therapy Initial Evaluation  Current Date:  9/21/2017    Subjective:  Katrin Fitzpatrick is a 47 year old right hand dominant female.    Diagnosis:Right wrist tendonitis  DOI:  9/14/17 MD order date      Patient reports symptoms of pain, stiffness/loss of motion, weakness/loss of strength and edema of the right wrist which occurred due to unknown cause. Since onset symptoms are Gradually getting better. Special tests:  x-rays (-).  Previous treatment: pre-madelaine d-ring given at PCP office. General health as reported by patient is excellent.  Pertinent medical history includes: history of fractures, anemia, migraines/headaches.  Medical allergies: Insulin, Biaxin.  Surgical history: other: hysterectomy.  Medication history: none.    Occupational Profile Information:  Current occupation is Nurse  Currently working in normal job without restrictions  Job Tasks: prolonged standing, repetitive tasks, computer work  Prior functional level:  no limitations  Barriers include:none  Mobility: No difficulty  Transportation: drives  Leisure activities/hobbies: Read, working-out    Upper Extremity Functional Index Score:  SCORE:   Column Totals: /80: 62   (A lower score indicates greater disability.)      Objective:  Pain Level Report  VAS(0-10) 9/21/2017   At Rest: 0   With Use: 1-2     Report of Pain:  Location:  wrist  Pain Quality:  Dull, Sharp and Shooting  Frequency: intermittent    Pain is worst:  No specific time  Exacerbated by:  Certain positons  Relieved by:  stretch and pre-madelaine splint  Progression:  Gradually getting better  Edema:  MILD dorsal wrist over ulnar styloid  Sensation: WNL throughout all nerve distributions; per patient report    Wrist  9/21/2017   AROM (PROM) R L   Extension 65 78   Flexion 79 83   RD 16 30   UD 36 31   Supination 80 75   Pronation 85 77     Tenderness: Pain level report on scale 0-10/10  Date 9/21/2017   Side R   Ulna Styloid 2-3   TFCC 2   Distal Radius 0   Radial Styloid 0   DRUJ 0    Volar Scaphoid 0   Dorsal Scaphoid 0   Volar Lunate 0   Dorsal Lunate 1     Central Dorsal Zone: (+ for hypermobile or - for hypomobile) Pain 0-10/10  Date 9/21/2017   Side    Finger Extension Test (SL--in wrist flex, resist long ext) -   Bennett Test (SL) -   Linscheid Test (CMC joints--stabilize distal MC, mob CMC) -   Ballottement Scaphoid on Lunte -     Ulnar Dorsal Zone: (+ for hypermobile or - for hypomobile) Pain 0-10/10  Date 9/21/2017   Side    Radiocarpal P/S (TFCC--stab f/a, rotate with some compression) -   Ballottement II P/S (TFCC--stab hand/wrist, pt p/s) -   TFCC load Test (UD, axially load wrist c volar/dorsal mvmt) -   UMTDG test (TFCC--approximate the pisotriquetral and ulna) +   Lunotriquetral Sheer Test +   Piano Key Sign (DRUJ) -   Piano Key Test (DRUJ--distal ulna moved in pro/sup) -   Ballottement I: E/F (DRUJ--stabilize hand/wrist, pt e/f of elbow)    Volar RU Ligament Shift (DRUJ--stab ulna and wrist, push radius volarly)    Dorsal RU Ligament Shift (DRUJ--stab ulna and wrist, push radius dorsally)      Strength:   (Measured in pounds)    9/21/2017   Trials R L   1  2  3 60 70   Average:       Lat Pinch  9/21/2017   Trials R L   1  2  3 12 14   Average:       3 Pt Pinch  9/21/2017   Trials R L   1  2  3 7 15   Average:       Assessment:  Patient presents with symptoms consistent with diagnosis of right wrist tendonitis,  with conservative intervention.     Patient's limitations or Problem List includes:  Pain, Decreased ROM/motion, Increased edema and Weakness of the right wrist which interferes with the patient's ability to perform Self Care Tasks (dressing), Work Tasks, Recreational Activities, Household Chores and Driving  as compared to previous level of function.    Rehab Potential:  Excellent - Return to full activity, no limitations    Patient will benefit from skilled Occupational Therapy to increase ROM, overall strength and stability of wrist and decrease pain and edema to  return to previous activity level and resume normal daily tasks and to reach their rehab potential.    Barriers to Learning:  No barrier    Communication Issues:  Patient appears to be able to clearly communicate and understand verbal and written communication and follow directions correctly.    Chart Review: Chart Review and Brief history including review of medical and/or therapy records relating to the presenting problem    Identified Performance Deficits: dressing, driving and community mobility, health management and maintenance, home establishment and management, meal preparation and cleanup, work and leisure activities    Assessment of Occupational Performance:  5 or more Performance Deficits    Clinical Decision Making (Complexity): Low complexity    Treatment Explanation:  The following has been discussed with the patient:  RX ordered/plan of care  Anticipated outcomes  Possible risks and side effects    Plan:  Frequency:  1 X week, once daily  Duration:  for 6 weeks    Treatment Plan:   Modalities:  US and Iontophoresis  Therapeutic Exercise:  AROM, AAROM, PROM, Tendon Gliding, Isotonics, Isometrics and Stabilization  Neuromuscular re-education:  Nerve Gliding, Isometrics and Stabilization  Manual Techniques:  Joint mobilization and Myofascial release  Orthotic Fabrication:  Static orthosis and Forearm based orthosis  Self Care:  Self Care Tasks, Ergonomic Considerations and Work Tasks  Discharge Plan:  Achieve all LTG.  Independent in home treatment program.  Reach maximal therapeutic benefit.    Home Exercise Program:  Wrist AROM E/F UD/RD pain free    Next Visit:  Progress as tolerated

## 2017-09-28 ENCOUNTER — THERAPY VISIT (OUTPATIENT)
Dept: OCCUPATIONAL THERAPY | Facility: CLINIC | Age: 47
End: 2017-09-28
Payer: COMMERCIAL

## 2017-09-28 ENCOUNTER — TELEPHONE (OUTPATIENT)
Dept: OBGYN | Facility: CLINIC | Age: 47
End: 2017-09-28

## 2017-09-28 DIAGNOSIS — M77.8 TENDONITIS OF WRIST, RIGHT: ICD-10-CM

## 2017-09-28 DIAGNOSIS — M25.531 RIGHT WRIST PAIN: ICD-10-CM

## 2017-09-28 PROCEDURE — 97140 MANUAL THERAPY 1/> REGIONS: CPT | Mod: GO | Performed by: OCCUPATIONAL THERAPIST

## 2017-09-28 NOTE — TELEPHONE ENCOUNTER
Form received from: patient    Form requesting following info/need: [facility] / [place] Provider Screening Form    KHOI needed?: No    Location of form: [place] / Dr. Resendiz    When completed the route for return: Fax to [facility] at fax #[phone] and patient will  a copy.

## 2017-10-03 ENCOUNTER — HOSPITAL ENCOUNTER (OUTPATIENT)
Dept: MRI IMAGING | Facility: CLINIC | Age: 47
Discharge: HOME | End: 2017-10-03
Attending: FAMILY MEDICINE | Admitting: FAMILY MEDICINE
Payer: COMMERCIAL

## 2017-10-03 ENCOUNTER — OFFICE VISIT (OUTPATIENT)
Dept: ORTHOPEDICS | Facility: CLINIC | Age: 47
End: 2017-10-03
Payer: COMMERCIAL

## 2017-10-03 VITALS
DIASTOLIC BLOOD PRESSURE: 70 MMHG | BODY MASS INDEX: 26.58 KG/M2 | WEIGHT: 150 LBS | SYSTOLIC BLOOD PRESSURE: 110 MMHG | HEIGHT: 63 IN

## 2017-10-03 DIAGNOSIS — M25.361 INSTABILITY OF RIGHT KNEE JOINT: ICD-10-CM

## 2017-10-03 DIAGNOSIS — M67.969 TENDINOPATHY OF PATELLA: ICD-10-CM

## 2017-10-03 DIAGNOSIS — M25.361 INSTABILITY OF RIGHT KNEE JOINT: Primary | ICD-10-CM

## 2017-10-03 PROCEDURE — 99214 OFFICE O/P EST MOD 30 MIN: CPT | Performed by: FAMILY MEDICINE

## 2017-10-03 PROCEDURE — 73721 MRI JNT OF LWR EXTRE W/O DYE: CPT | Mod: RT

## 2017-10-03 NOTE — MR AVS SNAPSHOT
After Visit Summary   10/3/2017    Katrni Fitzpatrick    MRN: [id number]           Patient Information     Date Of Birth          1970        Visit Information        Provider Department      10/3/2017 2:20 PM Altaf Purvis DO [facility]        Today's Diagnoses     Instability of right knee joint    -  1    Tendinopathy of patella          Care Instructions    Thank you for allowing us to participate in your care today.  Please find below your visit diagnosis and the plan going forward.    1. Instability of right knee joint    2. Tendinopathy of patella      Discussed exam and recommend imaging  MRI of your right knee has been ordered. Will check for same day otherwise, schedule with [place] ([phone]). Once you know the date of your MRI, please call my office and schedule a follow-up visit for 2 days after that.  Would not do squats and lunges. No jumping. Ok to do leg press and bike.    Follow up over [facility] with results with next steps. Call direct clinic number [[phone]] at any time with questions or concerns.    Altaf Purvis DO [facility]  Website: [email or web address]  Twitter: [email or web address]            Follow-ups after your visit        Your next 10 appointments already scheduled     Oct 05, 2017 11:30 AM CDT   [facility] with Stacie Browning, OT   [facility] ([facility] [place]  )    [address] [place], [place] [address] [facility]  [address] [place] [address]   [phone]            Oct 12, 2017 11:30 AM CDT   [facility] with Stacie Browning, OT   [facility] ([facility] [place]  )    [address] [place] [address]   [phone]            Oct 19, 2017  8:30 AM CDT   [facility] with Stacieelizabeth Browning, OT   [facility] ([facility] [place]  )    [address] [address]   [phone]              Future tests that were ordered for you today     Open Future Orders         "Priority Expected Expires Ordered    MR Knee Right w/o Contrast Routine  10/3/2018 10/3/2017            Who to contact     If you have questions or need follow up information about today's clinic visit or your schedule please contact [facility] directly at [phone].  Normal or non-critical lab and imaging results will be communicated to you by MyChart, letter or phone within 4 business days after the clinic has received the results. If you do not hear from us within 7 days, please contact the clinic through [employer] or phone. If you have a critical or abnormal lab result, we will notify you by phone as soon as possible.  Submit refill requests through [employer] or call your pharmacy and they will forward the refill request to us. Please allow 3 business days for your refill to be completed.          Additional Information About Your Visit        MyChart Information     [employer] gives you secure access to your electronic health record. If you see a primary care provider, you can also send messages to your care team and make appointments. If you have questions, please call your primary care clinic.  If you do not have a primary care provider, please call [phone] and they will assist you.        Care EveryWhere ID     This is your Care EveryWhere ID. This could be used by other organizations to access your [place] medical records  [id number]        Your Vitals Were     Height Last Period BMI (Body Mass Index)             5' 3\" (1.6 m) 11/29/2015 (Exact Date) 26.57 kg/m2          Blood Pressure from Last 3 Encounters:   10/03/17 110/70   09/14/17 120/78   09/11/17 100/60    Weight from Last 3 Encounters:   10/03/17 150 lb (68 kg)   09/14/17 154 lb (69.9 kg)   09/11/17 154 lb 14.4 oz (70.3 kg)               Primary Care Provider Office Phone # Fax #    Mauri Resendiz [phone]       [address]  [facility] [address]        Equal Access to Services     YVONNE FULTON " AH: Ana minaya Kade, waessieda luqadaha, qaybta kamariana pardeepfreya jody dung samuelpete coffmanarelisingrid kothari. So [facility] [phone].    ATENCIÓN: Si habla español, tiene a bentley disposición servicios gratuitos de asistencia lingüística. Llame al [phone].    We comply with applicable federal civil rights laws and [place] laws. We do not discriminate on the basis of race, color, national origin, age, disability, sex, sexual orientation, or gender identity.            Thank you!     Thank you for choosing [facility]  for your care. Our goal is always to provide you with excellent care. Hearing back from our patients is one way we can continue to improve our services. Please take a few minutes to complete the written survey that you may receive in the mail after your visit with us. Thank you!             Your Updated Medication List - Protect others around you: Learn how to safely use, store and throw away your medicines at www.disposemymeds.org.          This list is accurate as of: 10/3/17  2:52 PM.  Always use your most recent med list.                   Brand Name Dispense Instructions for use Diagnosis    order for DME     1 Units    Equipment being ordered: wrist brace    Wrist sprain, right, initial encounter

## 2017-10-03 NOTE — PROGRESS NOTES
"ASSESSMENT & PLAN    ICD-10-CM    1. Instability of right knee joint M25.361 MR Knee Right w/o Contrast   2. Tendinopathy of patella M76.50 MR Knee Right w/o Contrast   Discussed exam and history - eval for loose bodies, large chondral defects and extend of patellar tendinopathy  MRI of your right knee has been ordered.     Follow up over [facility] with results with next steps. Call direct clinic number [[phone]] at any time with questions or concerns.    -----    SUBJECTIVE  Katrin Fitzpatrick is a/an 47 year old female who is seen as self referral for evaluation of right anterior knee pain. The patient is seen by themselves.    Onset: 11/2016. Patient describes injury as she slipped on the ice and landed on the front of her knee. She fell again in 1/2017 and 3/2017 onto the same knee.  Worsened by: stairs, up from squatting, kneeling  Better with: unknown  Quality: pulling, sharp, catching  Pain Scale (maximum/current)/10: 10/10 / 1/10  Treatments tried: ice, heat and ibuprofen  Orthopedic history: YES - Date: see above, fell on knee 10-15 years ago  Relevant surgical history: NO  Patient Social History: works as a nurse    Patient's past medical, surgical, social, and family histories were reviewed today and no changes are noted.    REVIEW OF SYSTEMS:  10 point ROS is negative other than symptoms noted above in HPI, Past Medical History or as stated below  Constitutional: NEGATIVE for fever, chills, change in weight  Skin: NEGATIVE for worrisome rashes, moles or lesions  GI/: NEGATIVE for bowel or bladder changes  Neuro: NEGATIVE for weakness, dizziness or paresthesias    OBJECTIVE:  /70  Ht 5' 3\" (1.6 m)  Wt 150 lb (68 kg)  LMP 11/29/2015 (Exact Date)  BMI 26.57 kg/m2   General: healthy, alert and in no distress  HEENT: no scleral icterus or conjunctival erythema  Skin: no suspicious lesions or rash. No jaundice.  CV: no pedal edema  Resp: normal respiratory effort without conversational dyspnea "   Psych: normal mood and affect  Gait: normal steady gait with appropriate coordination and balance  Neuro: Normal light sensory exam of lower extremity  MSK:  RIGHT KNEE  Inspection:    normal alignment, increased knee valgus with one-legged squat  Palpation:    Mildly tender about the medial > lateral patellar facets, patella tendon, ITB and Gerdy's. Remainder of bony and ligamentous landmarks are nontender.    No effusion is present    Patellofemoral crepitus is present  Range of Motion:     00 extension to 1200 flexion  Strength:    Extensor mechanism intact  Special Tests:    Positive: patellar grind and apprehension, equivocal Elsy's    Negative: Lachman's, posterior drawer    Independent visualization of the below image:   XR KNEE RT 3 VW 3/1/2017 10:35 AM     HISTORY: Right knee pain.     COMPARISON: None.     FINDINGS: Joint spaces are preserved. No significant osseous  degenerative change. No fracture or malalignment. Small joint effusion  seen on the lateral view.         IMPRESSION: Small joint effusion. No acute osseous abnormality.     JOHN ESTEVEZ MD    Patient's conditions were thoroughly discussed during today's visit with greater than 50% of the visit spent counseling the patient with total time spent face-to-face with the patient being 20 minutes.    Altaf Purvis, DO [facility]

## 2017-10-03 NOTE — PATIENT INSTRUCTIONS
Thank you for allowing us to participate in your care today.  Please find below your visit diagnosis and the plan going forward.    1. Instability of right knee joint    2. Tendinopathy of patella      Discussed exam and recommend imaging  MRI of your right knee has been ordered. Will check for same day otherwise, schedule with [facility] ([phone]). Once you know the date of your MRI, please call my office and schedule a follow-up visit for 2 days after that.  Would not do squats and lunges. No jumping. Ok to do leg press and bike.    Follow up over MyChart with results with next steps. Call direct clinic number [[phone]] at any time with questions or concerns.    Altaf Purvis DO CAQSM  [facility]  Website: [email or web address]  Twitter: [email or web address]

## 2017-10-03 NOTE — NURSING NOTE
"Chief Complaint   Patient presents with     Musculoskeletal Problem     right knee pain       Initial /70  Ht 5' 3\" (1.6 m)  Wt 150 lb (68 kg)  LMP 11/29/2015 (Exact Date)  BMI 26.57 kg/m2 Estimated body mass index is 26.57 kg/(m^2) as calculated from the following:    Height as of this encounter: 5' 3\" (1.6 m).    Weight as of this encounter: 150 lb (68 kg).  Medication Reconciliation: complete     Aditya Chan, ATC    "

## 2017-10-04 NOTE — TELEPHONE ENCOUNTER
Form completed and faxed.  Original form sent to [facility].

## 2017-10-05 ENCOUNTER — THERAPY VISIT (OUTPATIENT)
Dept: OCCUPATIONAL THERAPY | Facility: CLINIC | Age: 47
End: 2017-10-05
Payer: COMMERCIAL

## 2017-10-05 DIAGNOSIS — M77.8 TENDONITIS OF WRIST, RIGHT: ICD-10-CM

## 2017-10-05 DIAGNOSIS — M25.531 RIGHT WRIST PAIN: ICD-10-CM

## 2017-10-05 PROCEDURE — 97140 MANUAL THERAPY 1/> REGIONS: CPT | Mod: GO | Performed by: OCCUPATIONAL THERAPIST

## 2017-10-12 ENCOUNTER — THERAPY VISIT (OUTPATIENT)
Dept: OCCUPATIONAL THERAPY | Facility: CLINIC | Age: 47
End: 2017-10-12
Payer: COMMERCIAL

## 2017-10-12 DIAGNOSIS — M25.531 RIGHT WRIST PAIN: ICD-10-CM

## 2017-10-12 DIAGNOSIS — M77.8 TENDONITIS OF WRIST, RIGHT: ICD-10-CM

## 2017-10-12 PROCEDURE — 97140 MANUAL THERAPY 1/> REGIONS: CPT | Mod: GO | Performed by: OCCUPATIONAL THERAPIST

## 2017-10-12 PROCEDURE — 97110 THERAPEUTIC EXERCISES: CPT | Mod: GO | Performed by: OCCUPATIONAL THERAPIST

## 2017-10-19 ENCOUNTER — THERAPY VISIT (OUTPATIENT)
Dept: OCCUPATIONAL THERAPY | Facility: CLINIC | Age: 47
End: 2017-10-19
Payer: COMMERCIAL

## 2017-10-19 DIAGNOSIS — M25.531 RIGHT WRIST PAIN: ICD-10-CM

## 2017-10-19 DIAGNOSIS — M77.8 TENDONITIS OF WRIST, RIGHT: ICD-10-CM

## 2017-10-19 PROCEDURE — 97110 THERAPEUTIC EXERCISES: CPT | Mod: GO | Performed by: OCCUPATIONAL THERAPIST

## 2017-10-19 PROCEDURE — 97140 MANUAL THERAPY 1/> REGIONS: CPT | Mod: GO | Performed by: OCCUPATIONAL THERAPIST

## 2017-10-19 NOTE — MR AVS SNAPSHOT
After Visit Summary   10/19/2017    Katrin Fitzpatrick    MRN: [id number]           Patient Information     Date Of Birth          1970        Visit Information        Provider Department      10/19/2017 8:30 AM Stacie Browning OT [facility] [place]        Today's Diagnoses     Tendonitis of wrist, right        Right wrist pain           Follow-ups after your visit        Who to contact     If you have questions or need follow up information about today's clinic visit or your schedule please contact [facility] directly at [phone].  Normal or non-critical lab and imaging results will be communicated to you by Policardhart, letter or phone within 4 business days after the clinic has received the results. If you do not hear from us within 7 days, please contact the clinic through Only Mallorcat or phone. If you have a critical or abnormal lab result, we will notify you by phone as soon as possible.  Submit refill requests through Curiously or call your pharmacy and they will forward the refill request to us. Please allow 3 business days for your refill to be completed.          Additional Information About Your Visit        MyChart Information     Curiously gives you secure access to your electronic health record. If you see a primary care provider, you can also send messages to your care team and make appointments. If you have questions, please call your primary care clinic.  If you do not have a primary care provider, please call [phone] and they will assist you.        Care EveryWhere ID     This is your Care EveryWhere ID. This could be used by other organizations to access your [place] medical records  [id number]        Your Vitals Were     Last Period                   11/29/2015 (Exact Date)            Blood Pressure from Last 3 Encounters:   10/03/17 110/70   09/14/17 120/78   09/11/17 100/60    Weight from Last 3 Encounters:   10/03/17 68 kg (150 lb)   09/14/17 69.9 kg (154 lb)    09/11/17 70.3 kg (154 lb 14.4 oz)              We Performed the Following     MANUAL THER TECH,1+REGIONS,EA 15 MIN     THERAPEUTIC EXERCISES        Primary Care Provider Office Phone # Fax #    Mauri Resendiz [phone]       [address]  [facility] [address]        Equal Access to Services     [facility]: Hadii aad ku hadasho Soomaali, waaxda luqadaha, qaybta kaalmada adeegyada, waxay idiin hayaan adeeg kharash la'aan . So [facility] [phone].    ATENCIÓN: Si habla español, tiene a bentley disposición servicios gratuitos de asistencia lingüística. Llame al [phone].    We comply with applicable federal civil rights laws and [place] laws. We do not discriminate on the basis of race, color, national origin, age, disability, sex, sexual orientation, or gender identity.            Thank you!     Thank you for choosing [facility]  for your care. Our goal is always to provide you with excellent care. Hearing back from our patients is one way we can continue to improve our services. Please take a few minutes to complete the written survey that you may receive in the mail after your visit with us. Thank you!             Your Updated Medication List - Protect others around you: Learn how to safely use, store and throw away your medicines at www.disposemymeds.org.          This list is accurate as of: 10/19/17  9:23 AM.  Always use your most recent med list.                   Brand Name Dispense Instructions for use Diagnosis    order for DME     1 Units    Equipment being ordered: wrist brace    Wrist sprain, right, initial encounter

## 2017-11-24 PROBLEM — M77.8 TENDONITIS OF WRIST, RIGHT: Status: RESOLVED | Noted: 2017-09-21 | Resolved: 2017-11-24

## 2017-11-24 PROBLEM — M25.531 RIGHT WRIST PAIN: Status: RESOLVED | Noted: 2017-09-21 | Resolved: 2017-11-24

## 2017-11-24 NOTE — PROGRESS NOTES
Pt has not returned since 10/19/17.  Assume all goals are met to pt satisfaction.  D/C [facility]

## 2018-01-30 ENCOUNTER — OFFICE VISIT (OUTPATIENT)
Dept: ORTHOPEDICS | Facility: CLINIC | Age: 48
End: 2018-01-30
Payer: COMMERCIAL

## 2018-01-30 VITALS
SYSTOLIC BLOOD PRESSURE: 115 MMHG | WEIGHT: 150 LBS | DIASTOLIC BLOOD PRESSURE: 80 MMHG | BODY MASS INDEX: 26.58 KG/M2 | HEIGHT: 63 IN

## 2018-01-30 DIAGNOSIS — M17.11 PRIMARY OSTEOARTHRITIS OF RIGHT KNEE: Primary | ICD-10-CM

## 2018-01-30 PROCEDURE — 20611 DRAIN/INJ JOINT/BURSA W/US: CPT | Mod: RT | Performed by: FAMILY MEDICINE

## 2018-01-30 PROCEDURE — 99213 OFFICE O/P EST LOW 20 MIN: CPT | Mod: 25 | Performed by: FAMILY MEDICINE

## 2018-01-30 RX ORDER — METHYLPREDNISOLONE ACETATE 40 MG/ML
40 INJECTION, SUSPENSION INTRA-ARTICULAR; INTRALESIONAL; INTRAMUSCULAR; SOFT TISSUE ONCE
Qty: 1 ML | Refills: 0 | OUTPATIENT
Start: 2018-01-30 | End: 2018-01-30

## 2018-01-30 NOTE — PROGRESS NOTES
"ASSESSMENT & PLAN    1. Primary osteoarthritis of right knee      Discussed MRI - PF chondromalacia is most problematic / causative  No pain over lateral patella tendon  Steroid injection of the right knee was performed today in clinic  If pain is not well controlled can consider formal physical therapy    Follow up as needed. Call direct clinic number [[phone]] at any time with questions or concerns.    -----    SUBJECTIVE:  Katrin Fitzpatrick is a 47 year old female who is seen in follow-up for right knee pain.They were last seen 10/3/2017. The patient is seen with their .    They indicate that their current pain level is 3/10. Notes that last week she stepped wrong on her right leg and twisted her knee causing a sharp pain in the front of her knee/patellar region. Notes increased pain that lasted for several days after this episode. They have tried ice, heat, home exercises and ibuprofen.      Patient's past medical, surgical, social, and family histories were reviewed today and no changes are noted.    REVIEW OF SYSTEMS:  Constitutional: NEGATIVE for fever, chills, change in weight  Skin: NEGATIVE for worrisome rashes, moles or lesions  GI/: NEGATIVE for bowel or bladder changes  Neuro: NEGATIVE for weakness, dizziness or paresthesias    OBJECTIVE:  Ht 5' 3\" (1.6 m)  Wt 150 lb (68 kg)  LMP 11/29/2015 (Exact Date)  BMI 26.57 kg/m2   General: healthy, alert and in no distress  HEENT: no scleral icterus or conjunctival erythema  Skin: no suspicious lesions or rash. No jaundice.  CV: no pedal edema  Resp: normal respiratory effort without conversational dyspnea   Psych: normal mood and affect  Gait: normal steady gait with appropriate coordination and balance  Neuro: normal light touch sensory exam of the extremities.    MSK:  RIGHT KNEE  Inspection:    normal alignment  Palpation:    Tender about the lateral patellar facet and medial patellar facet. Remainder of bony and ligamentous landmarks are " nontender.    No effusion is present    Patellofemoral crepitus is Present  Range of Motion:     00 extension to 1350 flexion  Strength:    Extensor mechanism intact  Special Tests:    Positive: Patellar grind    Negative: MCL/valgus stress (0 & 30 deg), LCL/varus stress (0 & 30 deg), Lachman's, posterior drawer, Elsy's    Independent visualization of the below image:  MR RIGHT KNEE WITHOUT CONTRAST   10/3/2017 3:38 PM     HISTORY: Access patellofemoral joint and patella tendon. Other  instability, right knee. Patellar tendinitis, unspecified knee.     TECHNIQUE:  Sagittal proton density and T2, coronal T1, and coronal  and transverse fat suppressed T2 weighted images.     COMPARISON: X-ray from 3/1/2017.     FINDINGS:   Medial Meniscus: No tear, displaced fragment, or extrusion.        Lateral Meniscus: No tear, displaced fragment, or extrusion.        Anterior Cruciate Ligament: Intact.      Posterior Cruciate Ligament: Intact.      Medial Collateral Ligament: Intact.     Lateral Collateral Ligament Complex, Popliteus Tendon: The fibular  collateral ligament, biceps femoris tendon, popliteal tendon, and  iliotibial band are intact.     Osseous Structures and Cartilaginous Surfaces:  Grade 2 to grade 3  lateral facet chondromalacia measuring approximately 0.8 cm in  diameter. There is grade II chondromalacia medial facet as well.  Adjacent femoral trochlear articular cartilage is intact.     Extensor Mechanism: The quadriceps and infrapatellar tendons are  intact. The medial and lateral patellar retinacula appear  unremarkable.     Joint Space: There is a physiological amount of fluid in the joint  space.  No definite articular bodies are demonstrated.     Additional Findings:  No semimembranosus-tibial collateral ligament or  pes anserine bursitis. No Baker's cyst. There is edema deep to the  lateral aspect of the patellar tendon likely related to patellar  tendon lateral femoral condyle friction syndrome. Its  possible that a  direct contusion could have this appearance as well.         IMPRESSION:    1. Grade 2 to grade 3 lateral facet chondromalacia patella measuring  approximately 0.8 cm in diameter. There is an ill-defined area of  grade II chondromalacia medial facet of the patella as well.  2. There is edema in the soft tissue deep to the lateral aspect of the  patellar tendon likely related to patellar tendon lateral femoral  condyle friction syndrome versus a direct contusion in this region.  Recommend clinical correlation.  3. No evidence of meniscus tear or ligamentous injury.     JOHN SINCLAIR MD    Right Knee Intra-Articular Corticosteroid Injection     Diagnoses (preoperative and postoperative):  Right knee pain/osteoarthritis  Current Procedure (include preoperative):  Sonographically guided right knee intra-articular corticosteroid injection  Current Indication (include preoperative):  Alleviation of pain  REASON FOR REFERRAL:   a right knee intra-articular corticosteroid injection to help with modulation of pain. Sonographic guidance will be used to ensure accurate placement of the medication within the joint space as the patient's landmarks are not easily palpated.  PATIENT EDUCATION:  Ready to learn with no apparent learning barriers identified.  Learning preferences include listening. Explained diagnosis and treatment plan as well as treatment alternatives. Patient expressed understanding of the content.  Following denial of allergy and review of potential side effects and complications including but not necessarily limited to infection, bleeding, allergic reaction, post-injection flare, local tissue breakdown, injury to soft tissue and/or nerves and seizure, patient indicated their understanding and agreed to proceed. A written consent was obtained and is scanned into the chart. Written and signed consent obtained and is scanned into the chart.  PROCEDURE:  Prior to the procedure, the right knee joint  was examined with a 12 MHz linear transducer to visualize the joint space and determine the approach for the procedure.  Procedure was carried out using sterile technique including Chloraprep scrub, a sterile transducer cover, and sterile transducer gel. A simple surgical tray was used.  PROCEDURAL PAUSE:  Procedural pause conducted to verify correct patient identity, procedure to be performed, and as applicable, correct side/site, correct patient position, availability of implants, special equipment, or special requirements.  Patient position:  Supine  Transducer type:  12 MHz linear array transducer  Approach: Lateral to medial parallel to long axis of transducer  Local Anesthesia:  22 gauge 2.5 inch needle was used to anesthetize the skin, subcutaneous tissue and advanced into the right knee joint with 5 ml of 1% Lidocaine  Injection: After confirming needle tip position, syringe was replaced with one containing 1 ml of 40 mg/ml Depo Medrol and 4 ml of 1% Lidocaine which was injected and seen filling the joint space. Needle was removed bandage placed over the wound.  AFTERCARE:  Patient tolerated the procedure without complication. After a short observation period, the patient was discharged under their own power and in excellent condition.  Patient's conditions were thoroughly discussed during today's visit with greater than 50% of the visit spent counseling the patient with total time spent face-to-face with the patient being 20 minutes with injection taking 5 minutes.    Altaf Purvis DO [facility] [place] [facility]

## 2018-01-30 NOTE — LETTER
"    1/30/2018         RE: Katrin Fitzpatrick  [address]  [place] [address]        Dear Colleague,    Thank you for referring your patient, Katrin Fitzpatrick, to the [facility]. Please see a copy of my visit note below.    ASSESSMENT & PLAN    1. Primary osteoarthritis of right knee      Discussed MRI - PF chondromalacia is most problematic / causative  No pain over lateral patella tendon  Steroid injection of the right knee was performed today in clinic  If pain is not well controlled can consider formal physical therapy    Follow up as needed. Call direct clinic number [[phone]] at any time with questions or concerns.    -----    SUBJECTIVE:  Katrin Fitzpatrick is a 47 year old female who is seen in follow-up for right knee pain.They were last seen 10/3/2017. The patient is seen with their .    They indicate that their current pain level is 3/10. Notes that last week she stepped wrong on her right leg and twisted her knee causing a sharp pain in the front of her knee/patellar region. Notes increased pain that lasted for several days after this episode. They have tried ice, heat, home exercises and ibuprofen.      Patient's past medical, surgical, social, and family histories were reviewed today and no changes are noted.    REVIEW OF SYSTEMS:  Constitutional: NEGATIVE for fever, chills, change in weight  Skin: NEGATIVE for worrisome rashes, moles or lesions  GI/: NEGATIVE for bowel or bladder changes  Neuro: NEGATIVE for weakness, dizziness or paresthesias    OBJECTIVE:  Ht 5' 3\" (1.6 m)  Wt 150 lb (68 kg)  LMP 11/29/2015 (Exact Date)  BMI 26.57 kg/m2   General: healthy, alert and in no distress  HEENT: no scleral icterus or conjunctival erythema  Skin: no suspicious lesions or rash. No jaundice.  CV: no pedal edema  Resp: normal respiratory effort without conversational dyspnea   Psych: normal mood and affect  Gait: normal steady gait with appropriate coordination and " balance  Neuro: normal light touch sensory exam of the extremities.    MSK:  RIGHT KNEE  Inspection:    normal alignment  Palpation:    Tender about the lateral patellar facet and medial patellar facet. Remainder of bony and ligamentous landmarks are nontender.    No effusion is present    Patellofemoral crepitus is Present  Range of Motion:     00 extension to 1350 flexion  Strength:    Extensor mechanism intact  Special Tests:    Positive: Patellar grind    Negative: MCL/valgus stress (0 & 30 deg), LCL/varus stress (0 & 30 deg), Lachman's, posterior drawer, Elsy's    Independent visualization of the below image:  MR RIGHT KNEE WITHOUT CONTRAST   10/3/2017 3:38 PM     HISTORY: Access patellofemoral joint and patella tendon. Other  instability, right knee. Patellar tendinitis, unspecified knee.     TECHNIQUE:  Sagittal proton density and T2, coronal T1, and coronal  and transverse fat suppressed T2 weighted images.     COMPARISON: X-ray from 3/1/2017.     FINDINGS:   Medial Meniscus: No tear, displaced fragment, or extrusion.        Lateral Meniscus: No tear, displaced fragment, or extrusion.        Anterior Cruciate Ligament: Intact.      Posterior Cruciate Ligament: Intact.      Medial Collateral Ligament: Intact.     Lateral Collateral Ligament Complex, Popliteus Tendon: The fibular  collateral ligament, biceps femoris tendon, popliteal tendon, and  iliotibial band are intact.     Osseous Structures and Cartilaginous Surfaces:  Grade 2 to grade 3  lateral facet chondromalacia measuring approximately 0.8 cm in  diameter. There is grade II chondromalacia medial facet as well.  Adjacent femoral trochlear articular cartilage is intact.     Extensor Mechanism: The quadriceps and infrapatellar tendons are  intact. The medial and lateral patellar retinacula appear  unremarkable.     Joint Space: There is a physiological amount of fluid in the joint  space.  No definite articular bodies are  demonstrated.     Additional Findings:  No semimembranosus-tibial collateral ligament or  pes anserine bursitis. No Baker's cyst. There is edema deep to the  lateral aspect of the patellar tendon likely related to patellar  tendon lateral femoral condyle friction syndrome. Its possible that a  direct contusion could have this appearance as well.         IMPRESSION:    1. Grade 2 to grade 3 lateral facet chondromalacia patella measuring  approximately 0.8 cm in diameter. There is an ill-defined area of  grade II chondromalacia medial facet of the patella as well.  2. There is edema in the soft tissue deep to the lateral aspect of the  patellar tendon likely related to patellar tendon lateral femoral  condyle friction syndrome versus a direct contusion in this region.  Recommend clinical correlation.  3. No evidence of meniscus tear or ligamentous injury.     JOHN SINCLAIR MD    Right Knee Intra-Articular Corticosteroid Injection     Diagnoses (preoperative and postoperative):  Right knee pain/osteoarthritis  Current Procedure (include preoperative):  Sonographically guided right knee intra-articular corticosteroid injection  Current Indication (include preoperative):  Alleviation of pain  REASON FOR REFERRAL:   a right knee intra-articular corticosteroid injection to help with modulation of pain. Sonographic guidance will be used to ensure accurate placement of the medication within the joint space as the patient's landmarks are not easily palpated.  PATIENT EDUCATION:  Ready to learn with no apparent learning barriers identified.  Learning preferences include listening. Explained diagnosis and treatment plan as well as treatment alternatives. Patient expressed understanding of the content.  Following denial of allergy and review of potential side effects and complications including but not necessarily limited to infection, bleeding, allergic reaction, post-injection flare, local tissue breakdown, injury to soft  tissue and/or nerves and seizure, patient indicated their understanding and agreed to proceed. A written consent was obtained and is scanned into the chart. Written and signed consent obtained and is scanned into the chart.  PROCEDURE:  Prior to the procedure, the right knee joint was examined with a 12 MHz linear transducer to visualize the joint space and determine the approach for the procedure.  Procedure was carried out using sterile technique including Chloraprep scrub, a sterile transducer cover, and sterile transducer gel. A simple surgical tray was used.  PROCEDURAL PAUSE:  Procedural pause conducted to verify correct patient identity, procedure to be performed, and as applicable, correct side/site, correct patient position, availability of implants, special equipment, or special requirements.  Patient position:  Supine  Transducer type:  12 MHz linear array transducer  Approach: Lateral to medial parallel to long axis of transducer  Local Anesthesia:  22 gauge 2.5 inch needle was used to anesthetize the skin, subcutaneous tissue and advanced into the right knee joint with 5 ml of 1% Lidocaine  Injection: After confirming needle tip position, syringe was replaced with one containing 1 ml of 40 mg/ml Depo Medrol and 4 ml of 1% Lidocaine which was injected and seen filling the joint space. Needle was removed bandage placed over the wound.  AFTERCARE:  Patient tolerated the procedure without complication. After a short observation period, the patient was discharged under their own power and in excellent condition.  Patient's conditions were thoroughly discussed during today's visit with greater than 50% of the visit spent counseling the patient with total time spent face-to-face with the patient being 20 minutes with injection taking 5 minutes.    Altaf Purvis, DO [facility]          Again, thank you for allowing me to participate in the care of your patient.         Sincerely,        Altaf Purvis DO

## 2018-01-30 NOTE — MR AVS SNAPSHOT
After Visit Summary   1/30/2018    Katrin Fitzpatrick    MRN: [id number]           Patient Information     Date Of Birth          1970        Visit Information        Provider Department      1/30/2018 5:40 PM Altaf Purvis,  [facility]        Today's Diagnoses     Primary osteoarthritis of right knee    -  1      Care Instructions    1. Primary osteoarthritis of right knee      Steroid injection of the right knee was performed today in clinic  - Ok to shower  - No bathtub, hot tub or swimming for 2 days  - The lidocaine (what is giving you pain relief right now) will likely stop working in 1-2 hours.  You will then have pain again, similar to before you received the injection. The corticosteroid will not start working until approximately 1-2 weeks from now.  - Ice today and  limit weight bearing exercise  / deep knee flexion to extension for 1-2 weeks. Thereafter you can resume your normal activity.  If pain is not well controlled can consider formal physical therapy    Follow up as needed. Call direct clinic number [[phone]] at any time with questions or concerns.      NEW WEBSITE HAS LAUNCHED  [email or web address]    We care about your feedback and use it to improve our services. Please leave feedback on the Testamonials & Reviews page.              Follow-ups after your visit        Who to contact     If you have questions or need follow up information about today's clinic visit or your schedule please contact [facility] directly at [phone].  Normal or non-critical lab and imaging results will be communicated to you by MyChart, letter or phone within 4 business days after the clinic has received the results. If you do not hear from us within 7 days, please contact the clinic through MyChart or phone. If you have a critical or abnormal lab result, we will notify you by phone as soon as possible.  Submit refill requests through  "MyChart or call your pharmacy and they will forward the refill request to us. Please allow 3 business days for your refill to be completed.          Additional Information About Your Visit        MyChart Information     Skweezt gives you secure access to your electronic health record. If you see a primary care provider, you can also send messages to your care team and make appointments. If you have questions, please call your primary care clinic.  If you do not have a primary care provider, please call [phone] and they will assist you.        Care EveryWhere ID     This is your Care EveryWhere ID. This could be used by other organizations to access your [place] medical records  [id number]        Your Vitals Were     Height Last Period BMI (Body Mass Index)             5' 3\" (1.6 m) 11/29/2015 (Exact Date) 26.57 kg/m2          Blood Pressure from Last 3 Encounters:   10/03/17 110/70   09/14/17 120/78   09/11/17 100/60    Weight from Last 3 Encounters:   01/30/18 150 lb (68 kg)   10/03/17 150 lb (68 kg)   09/14/17 154 lb (69.9 kg)              Today, you had the following     No orders found for display       Primary Care Provider Office Phone # Fax #    Mauri Resendiz [phone]       [address] [facility] [address]        Equal Access to Services     [facility] : Hadii aad ku hadasho Soomaali, waaxda luqadaha, qaybta kaalmada adeegyada, jody alfredo . So [facility] [phone].    ATENCIÓN: Si habla español, tiene a bentley disposición servicios gratuitos de asistencia lingüística. Llame al [phone].    We comply with applicable federal civil rights laws and [place] laws. We do not discriminate on the basis of race, color, national origin, age, disability, sex, sexual orientation, or gender identity.            Thank you!     Thank you for choosing [facility]  for your care. Our goal is always to provide you with excellent care. " Hearing back from our patients is one way we can continue to improve our services. Please take a few minutes to complete the written survey that you may receive in the mail after your visit with us. Thank you!             Your Updated Medication List - Protect others around you: Learn how to safely use, store and throw away your medicines at www.disposemymeds.org.          This list is accurate as of 1/30/18  6:17 PM.  Always use your most recent med list.                   Brand Name Dispense Instructions for use Diagnosis    order for DME     1 Units    Equipment being ordered: wrist brace    Wrist sprain, right, initial encounter

## 2018-01-31 NOTE — PATIENT INSTRUCTIONS
1. Primary osteoarthritis of right knee      Steroid injection of the right knee was performed today in clinic  - Ok to shower  - No bathtub, hot tub or swimming for 2 days  - The lidocaine (what is giving you pain relief right now) will likely stop working in 1-2 hours.  You will then have pain again, similar to before you received the injection. The corticosteroid will not start working until approximately 1-2 weeks from now.  - Ice today and  limit weight bearing exercise  / deep knee flexion to extension for 1-2 weeks. Thereafter you can resume your normal activity.  If pain is not well controlled can consider formal physical therapy    Follow up as needed. Call direct clinic number [[phone]] at any time with questions or concerns.      NEW WEBSITE HAS LAUNCHED  [email or web address]    We care about your feedback and use it to improve our services. Please leave feedback on the Testamonials & Reviews page.

## 2018-05-13 ENCOUNTER — HEALTH MAINTENANCE LETTER (OUTPATIENT)
Age: 48
End: 2018-05-13

## 2018-05-16 ENCOUNTER — OFFICE VISIT (OUTPATIENT)
Dept: PEDIATRICS | Facility: CLINIC | Age: 48
End: 2018-05-16
Payer: COMMERCIAL

## 2018-05-16 VITALS
HEART RATE: 63 BPM | TEMPERATURE: 97.4 F | DIASTOLIC BLOOD PRESSURE: 67 MMHG | WEIGHT: 163.1 LBS | SYSTOLIC BLOOD PRESSURE: 100 MMHG | BODY MASS INDEX: 28.89 KG/M2

## 2018-05-16 DIAGNOSIS — Z23 NEED FOR TDAP VACCINATION: ICD-10-CM

## 2018-05-16 DIAGNOSIS — N95.1 MENOPAUSAL SYNDROME (HOT FLASHES): ICD-10-CM

## 2018-05-16 DIAGNOSIS — Z80.3 FAMILY HISTORY OF MALIGNANT NEOPLASM OF BREAST: ICD-10-CM

## 2018-05-16 DIAGNOSIS — Z00.00 ROUTINE GENERAL MEDICAL EXAMINATION AT A HEALTH CARE FACILITY: Primary | ICD-10-CM

## 2018-05-16 DIAGNOSIS — Q14.2 COLOBOMA OF OPTIC DISC, RIGHT EYE: ICD-10-CM

## 2018-05-16 PROCEDURE — 82947 ASSAY GLUCOSE BLOOD QUANT: CPT | Performed by: NURSE PRACTITIONER

## 2018-05-16 PROCEDURE — 80061 LIPID PANEL: CPT | Performed by: NURSE PRACTITIONER

## 2018-05-16 PROCEDURE — 36415 COLL VENOUS BLD VENIPUNCTURE: CPT | Performed by: NURSE PRACTITIONER

## 2018-05-16 PROCEDURE — 84443 ASSAY THYROID STIM HORMONE: CPT | Performed by: NURSE PRACTITIONER

## 2018-05-16 PROCEDURE — 90471 IMMUNIZATION ADMIN: CPT | Performed by: NURSE PRACTITIONER

## 2018-05-16 PROCEDURE — 90715 TDAP VACCINE 7 YRS/> IM: CPT | Performed by: NURSE PRACTITIONER

## 2018-05-16 PROCEDURE — 99396 PREV VISIT EST AGE 40-64: CPT | Mod: 25 | Performed by: NURSE PRACTITIONER

## 2018-05-16 ASSESSMENT — ENCOUNTER SYMPTOMS
MYALGIAS: 0
COUGH: 0
HEMATOCHEZIA: 0
EYE PAIN: 1
NAUSEA: 0
ARTHRALGIAS: 1
SORE THROAT: 0
JOINT SWELLING: 0
HEARTBURN: 0
SHORTNESS OF BREATH: 0
CHILLS: 0
PALPITATIONS: 1
FREQUENCY: 0
ABDOMINAL PAIN: 0
PARESTHESIAS: 0
DYSURIA: 0
NERVOUS/ANXIOUS: 0
HEADACHES: 1
CONSTIPATION: 0
DIZZINESS: 0
HEMATURIA: 0
WEAKNESS: 0
FEVER: 0
DIARRHEA: 0
EYE PAIN: 0

## 2018-05-16 NOTE — MR AVS SNAPSHOT
After Visit Summary   5/16/2018    Katrin Fitzpatrick    MRN: [id number]           Patient Information     Date Of Birth          1970        Visit Information        Provider Department      5/16/2018 9:30 AM Roselyn Delarosa APRN [facility]        Today's Diagnoses     Routine general medical examination at a health care facility    -  1    Coloboma of optic disc, right eye        Family history of malignant neoplasm of breast        Need for Tdap vaccination        Menopausal syndrome (hot flashes)          Care Instructions      Preventive Health Recommendations  Female Ages 40 to 49    Yearly exam:     See your health care provider every year in order to  1. Review health changes.   2. Discuss preventive care.    3. Review your medicines if your doctor prescribed any.      Get a Pap test every three years (unless you have an abnormal result and your provider advises testing more often).      If you get Pap tests with HPV test, you only need to test every 5 years, unless you have an abnormal result. You do not need a Pap test if your uterus was removed (hysterectomy) and you have not had cancer.      You should be tested each year for STDs (sexually transmitted diseases), if you're at risk.       Ask your doctor if you should have a mammogram.      Have a colonoscopy (test for colon cancer) if someone in your family has had colon cancer or polyps before age 50.       Have a cholesterol test every 5 years.       Have a diabetes test (fasting glucose) after age 45. If you are at risk for diabetes, you should have this test every 3 years.    Shots: Get a flu shot each year. Get a tetanus shot every 10 years.     Nutrition:     Eat at least 5 servings of fruits and vegetables each day.    Eat whole-grain bread, whole-wheat pasta and brown rice instead of white grains and rice.    Talk to your provider about Calcium and Vitamin D.     Lifestyle    Exercise at least 150 minutes a  week (an average of 30 minutes a day, 5 days a week). This will help you control your weight and prevent disease.    Limit alcohol to one drink per day.    No smoking.     Wear sunscreen to prevent skin cancer.    See your dentist every six months for an exam and cleaning.          Follow-ups after your visit        Follow-up notes from your care team     Return in about 1 year (around 5/16/2019) for Routine Visit.      Your next 10 appointments already scheduled     May 22, 2018 11:00 AM CDT   [facility] with Altaf Purvis, DO   [facility] ([facility])    [address]  [address]  [place] [address]   [phone]            Jun 05, 2018  9:00 AM CDT   Pre-Op physical with YVES Almazan CNP   [facility] ([facility])    [address]  [address]  [place] [address] [phone]              Who to contact     If you have questions or need follow up information about today's clinic visit or your schedule please contact [facility] directly at [phone].  Normal or non-critical lab and imaging results will be communicated to you by Springpadhart, letter or phone within 4 business days after the clinic has received the results. If you do not hear from us within 7 days, please contact the clinic through Springpadhart or phone. If you have a critical or abnormal lab result, we will notify you by phone as soon as possible.  Submit refill requests through Eyeonplay or call your pharmacy and they will forward the refill request to us. Please allow 3 business days for your refill to be completed.          Additional Information About Your Visit        Eyeonplay Information     Eyeonplay gives you secure access to your electronic health record. If you see a primary care provider, you can also send messages to your care team and make appointments. If you have questions, please call your primary care  clinic.  If you do not have a primary care provider, please call [phone] and they will assist you.        Care EveryWhere ID     This is your Care EveryWhere ID. This could be used by other organizations to access your [facility] medical records  [id number]        Your Vitals Were     Pulse Temperature Last Period BMI (Body Mass Index)          63 97.4  F (36.3  C) (Tympanic) 11/29/2015 (Exact Date) 28.89 kg/m2         Blood Pressure from Last 3 Encounters:   05/16/18 100/67   01/30/18 115/80   10/03/17 110/70    Weight from Last 3 Encounters:   05/16/18 163 lb 1.6 oz (74 kg)   01/30/18 150 lb (68 kg)   10/03/17 150 lb (68 kg)              We Performed the Following     ADMIN 1st VACCINE     Glucose     Lipid panel reflex to direct LDL Fasting     TDAP VACCINE (ADACEL)     TSH with free T4 reflex        Primary Care Provider Office Phone # Fax #    YVES Almazan [phone] [address] [place] [address]        Equal Access to Services     [facility]: Hadii aad ku hadasho Soomaali, waaxda luqadaha, qaybta kaalmada bobbi, jody alfredo . So [facility] [phone].    ATENCIÓN: Si habla español, tiene a bentley disposición servicios gratuitos de asistencia lingüística. [facility] [phone].    We comply with applicable federal civil rights laws and [place] laws. We do not discriminate on the basis of race, color, national origin, age, disability, sex, sexual orientation, or gender identity.            Thank you!     Thank you for choosing [facility]  for your care. Our goal is always to provide you with excellent care. Hearing back from our patients is one way we can continue to improve our services. Please take a few minutes to complete the written survey that you may receive in the mail after your visit with us. Thank you!             Your Updated Medication List - Protect others around you: Learn how to safely use, store  and throw away your medicines at www.disposemymeds.org.      Notice  As of 5/16/2018 10:07 AM    You have not been prescribed any medications.

## 2018-05-16 NOTE — PROGRESS NOTES
SUBJECTIVE:   CC: Katrin Fitzpatrick is an 48 year old woman who presents for preventive health visit.     Physical   Annual:     Getting at least 3 servings of Calcium per day::  Yes    Bi-annual eye exam::  Yes    Dental care twice a year::  NO    Sleep apnea or symptoms of sleep apnea::  Daytime drowsiness    Diet::  Regular (no restrictions)    Frequency of exercise::  4-5 days/week    Duration of exercise::  30-45 minutes    Taking medications regularly::  Not Applicable    Additional concerns today::  No            Hx of hysterectomy, mom had uterine and br ca. She did not have genetic testing. Last mammo was last year, was abn and follow up showed a benign cyst withOUT biopsy. She has a follow up mammo scheduled tomorrow. She notes she has hot flashes. She noes she has lower pelvic pain once monthly either L or R side, likely PCOS type phenomenon x years.     Hx of coloboma of R eye, has limited vision of her R eye. She has an appt scheduled for eye surgery next month. [facility]      Hx of heart palpations x 15-20 years. Occurs on occasion, no chest pain, not worsening. Never been worked up for this.     Today's PHQ-2 Score:   PHQ-2 ( 1999 Pfizer) 5/16/2018   Q1: Little interest or pleasure in doing things 0   Q2: Feeling down, depressed or hopeless 0   PHQ-2 Score 0   Q1: Little interest or pleasure in doing things Not at all   Q2: Feeling down, depressed or hopeless Not at all   PHQ-2 Score 0       Abuse: Current or Past(Physical, Sexual or Emotional)- No  Do you feel safe in your environment - Yes    Social History   Substance Use Topics     Smoking status: Never Smoker     Smokeless tobacco: Never Used     Alcohol use Yes      Comment: four drinks twice per month/ not while pregnant     Alcohol Use 5/16/2018   If you drink alcohol do you typically have greater than 3 drinks per day OR greater than 7 drinks per week? No   No flowsheet data found.    Reviewed orders with patient.   Reviewed health maintenance and updated orders accordingly - Yes  Labs reviewed in EPIC    Patient under age 50, mutual decision reflected in health maintenance.      Pertinent mammograms are reviewed under the imaging tab.  History of abnormal Pap smear: Status post benign hysterectomy. Health Maintenance and Surgical History updated.    Reviewed and updated as needed this visit by clinical staff         Reviewed and updated as needed this visit by Provider            Review of Systems   Constitutional: Negative for chills and fever.   HENT: Positive for ear pain. Negative for congestion, hearing loss and sore throat.    Eyes: Positive for visual disturbance (r/t eye issues). Negative for pain.   Respiratory: Negative for cough and shortness of breath.    Cardiovascular: Positive for palpitations. Negative for chest pain and peripheral edema.   Gastrointestinal: Negative for abdominal pain, constipation, diarrhea, heartburn, hematochezia and nausea.   Genitourinary: Positive for pelvic pain (hx of potential pcos). Negative for dysuria, frequency, genital sores, hematuria, urgency, vaginal bleeding and vaginal discharge.   Musculoskeletal: Positive for arthralgias. Negative for joint swelling and myalgias.   Skin: Negative for rash.   Neurological: Positive for headaches (r/t to eye issues). Negative for dizziness, weakness and paresthesias.   Psychiatric/Behavioral: Negative for mood changes. The patient is not nervous/anxious.         OBJECTIVE:   /67  Pulse 63  Temp 97.4  F (36.3  C) (Tympanic)  Wt 163 lb 1.6 oz (74 kg)  LMP 11/29/2015 (Exact Date)  BMI 28.89 kg/m2  Physical Exam  GENERAL: healthy, alert and no distress  EYES: Eyes grossly normal to inspection, PERRL and conjunctivae and sclerae normal  HENT: ear canals and TM's normal, nose and mouth without ulcers or lesions  NECK: no adenopathy, no asymmetry, masses, or scars and thyroid normal to palpation  RESP: lungs clear to auscultation - no  "rales, rhonchi or wheezes  CV: regular rate and rhythm, normal S1 S2, no S3 or S4, no murmur, click or rub, no peripheral edema and peripheral pulses strong  MS: no gross musculoskeletal defects noted, no edema  SKIN: no suspicious lesions or rashes  PSYCH: mentation appears normal, affect normal/bright    ASSESSMENT/PLAN:   1. Routine general medical examination at a health care facility    - TSH with free T4 reflex  - Glucose  - Lipid panel reflex to direct LDL Fasting    2. Coloboma of optic disc, right eye  Continue with retina center    3. Family history of malignant neoplasm of breast  She does yearly mammos outside fv    4. Need for Tdap vaccination    - TDAP VACCINE (ADACEL)  - ADMIN 1st VACCINE    5. Menopausal syndrome (hot flashes)    - TSH with free T4 reflex    COUNSELING:  Reviewed preventive health counseling, as reflected in patient instructions  Special attention given to:        Regular exercise       Healthy diet/nutrition         reports that she has never smoked. She has never used smokeless tobacco.    Estimated body mass index is 28.89 kg/(m^2) as calculated from the following:    Height as of 1/30/18: 5' 3\" (1.6 m).    Weight as of this encounter: 163 lb 1.6 oz (74 kg).   Weight management plan: Patient was referred to their PCP to discuss a diet and exercise plan.    Counseling Resources:  ATP IV Guidelines  Pooled Cohorts Equation Calculator  Breast Cancer Risk Calculator  FRAX Risk Assessment  ICSI Preventive Guidelines  Dietary Guidelines for Americans, 2010  USDA's MyPlate  ASA Prophylaxis  Lung CA Screening    Roselyn Leigh, YVES [facility]  "

## 2018-05-17 ENCOUNTER — TRANSFERRED RECORDS (OUTPATIENT)
Dept: HEALTH INFORMATION MANAGEMENT | Facility: CLINIC | Age: 48
End: 2018-05-17

## 2018-05-17 LAB
CHOLEST SERPL-MCNC: 188 MG/DL
GLUCOSE SERPL-MCNC: 91 MG/DL (ref 70–99)
HDLC SERPL-MCNC: 45 MG/DL
LDLC SERPL CALC-MCNC: 119 MG/DL
NONHDLC SERPL-MCNC: 143 MG/DL
TRIGL SERPL-MCNC: 122 MG/DL
TSH SERPL DL<=0.005 MIU/L-ACNC: 1.21 MU/L (ref 0.4–4)

## 2018-05-22 ENCOUNTER — OFFICE VISIT (OUTPATIENT)
Dept: ORTHOPEDICS | Facility: CLINIC | Age: 48
End: 2018-05-22
Payer: COMMERCIAL

## 2018-05-22 VITALS
WEIGHT: 163 LBS | HEIGHT: 63 IN | BODY MASS INDEX: 28.88 KG/M2 | SYSTOLIC BLOOD PRESSURE: 108 MMHG | DIASTOLIC BLOOD PRESSURE: 66 MMHG

## 2018-05-22 DIAGNOSIS — M17.11 PRIMARY OSTEOARTHRITIS OF RIGHT KNEE: Primary | ICD-10-CM

## 2018-05-22 PROCEDURE — 99213 OFFICE O/P EST LOW 20 MIN: CPT | Performed by: FAMILY MEDICINE

## 2018-05-22 NOTE — MR AVS SNAPSHOT
After Visit Summary   5/22/2018    Katrin Fitzpatrick    MRN: [id number]           Patient Information     Date Of Birth          1970        Visit Information        Provider Department      5/22/2018 11:00 AM Altaf Purvis, DO [facility]        Today's Diagnoses     Primary osteoarthritis of right knee    -  1      Care Instructions    1. Primary osteoarthritis of right knee      Discussed exam - pain appears to related to known arthritis/cartilage wearing behind your kneecap.  This will intermittently bother you.  Acute exacerbation due to hitting your kneecap  Stairs, squats, lunges, kneeling onto your knees will bother you. Elliptical should be ok. If you run, limit hills  Physical therapy: [facility] - [phone]. For KT tape.  If you decide to pursue cortisone injection double check with Optho  Turmeric can be used as an anti-inflammatory - either [place] Naturals (Curcurmin and Fish Oil) or Pure Encapsulations (Curcurmin Complex 3)    Follow up as needed.          Follow-ups after your visit        Additional Services     [facility] PT, HAND, AND CHIROPRACTIC REFERRAL       **This order will print in the [facility] Scheduling Office**    Physical Therapy, Hand Therapy and Chiropractic Care are available through:    *[facility]  *[facility]  *[facility]    Call one number to schedule at any of the above locations: [phone].    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     Timothy Aguirre      Additional Comments for the Therapist or Chiropractor: Formal physical therapy - exercises to include neuromuscular/proprioceptive control and VMO/adductor/gluteus  strengthening. Please also include pain-free closed chain/isometric/isotonic strengthening with use of modalities (including kinesioptaping) as needed/deemed appropriate with home exercise prescription.      Please be aware that coverage of these services is subject to the terms and limitations of your health insurance plan.  Call member services at your health plan with any benefit or coverage questions.      Please bring the following to your appointment:    *Your personal calendar for scheduling future appointments  *Comfortable clothing                  Your next 10 appointments already scheduled     Jun 05, 2018  9:00 AM CDT   Pre-Op physical with VYES Almazan CNP   [facility] ([facility]    [address]  [address]  [place] [address] [phone]              Who to contact     If you have questions or need follow up information about today's clinic visit or your schedule please contact [facility] directly at [phone].  Normal or non-critical lab and imaging results will be communicated to you by Figmenthart, letter or phone within 4 business days after the clinic has received the results. If you do not hear from us within 7 days, please contact the clinic through Figmenthart or phone. If you have a critical or abnormal lab result, we will notify you by phone as soon as possible.  Submit refill requests through [facility] or call your pharmacy and they will forward the refill request to us. Please allow 3 business days for your refill to be completed.          Additional Information About Your Visit        FigmentharPostcron Information     [facility] gives you secure access to your electronic health record. If you see a primary care provider, you can also send messages to your care team and make appointments. If you have questions, please call your primary care clinic.  If you do not have a primary care provider, please call [phone] and they  "will assist you.        Care EveryWhere ID     This is your Care EveryWhere ID. This could be used by other organizations to access your [place] medical records  [id number]        Your Vitals Were     Height Last Period BMI (Body Mass Index)             5' 3\" (1.6 m) 11/29/2015 (Exact Date) 28.87 kg/m2          Blood Pressure from Last 3 Encounters:   05/22/18 108/66   05/16/18 100/67   01/30/18 115/80    Weight from Last 3 Encounters:   05/22/18 163 lb (73.9 kg)   05/16/18 163 lb 1.6 oz (74 kg)   01/30/18 150 lb (68 kg)              We Performed the Following     [facility] PT, HAND, AND CHIROPRACTIC REFERRAL        Primary Care Provider Office Phone # Fax #    Roselyn MARIA YVES Delarosa [phone] [address] [place] [address]        Equal Access to Services     [facility]: Hadii aad ku hadasho Soomaali, waaxda luqadaha, qaybta kaalmada adeegyada, waxay idiin hayrosangelan medardo kharaingrid alfredo . So [facility] [phone].    ATENCIÓN: Si sanjeev prince, tiene a bentley disposición servicios gratuitos de asistencia lingüística. Llame al [phone].    We comply with applicable federal civil rights laws and [place] laws. We do not discriminate on the basis of race, color, national origin, age, disability, sex, sexual orientation, or gender identity.            Thank you!     Thank you for choosing [facility]  for your care. Our goal is always to provide you with excellent care. Hearing back from our patients is one way we can continue to improve our services. Please take a few minutes to complete the written survey that you may receive in the mail after your visit with us. Thank you!             Your Updated Medication List - Protect others around you: Learn how to safely use, store and throw away your medicines at www.disposemymeds.org.      Notice  As of 5/22/2018  3:45 PM    You have not been prescribed any medications.      "

## 2018-05-22 NOTE — PROGRESS NOTES
"ASSESSMENT & PLAN    1. Primary osteoarthritis of right knee      Discussed exam - pain related to known patellofemoral pathology   Acute exacerbation due to hitting your kneecap  Stairs, squats, lunges, kneeling onto your knees will bother you. Elliptical should be ok. If you run, limit hills  Physical therapy: [facility] - [phone]. For KT tape.  If you decide to pursue cortisone injection double check with Optho given upcoming surgery  Turmeric can be used as an anti-inflammatory - either [place] Naturals (Curcurmin and Fish Oil) or Pure Encapsulations (Curcurmin Complex 3)    Follow up as needed.    -----    SUBJECTIVE:  Katrin Fitzpatrick is a 48 year old female who is seen in follow-up for chronic right knee pain/osteoarthritis.They were last seen 1/30/2018 with a right knee intra-articular corticosteroid injection completed at that time.  Patient reports that she got great relief from the injection that lasted until recently.  Patient states the pain returned in her knee after hitting her anterior knee on a shred box at work approximately 3 weeks ago. Patient states that the pain in her right knee is anterior and anteromedial patellar facet.  She is following up today for further evaluation and recommendations for treatments.    They indicate that their current pain level is 5/10. Pain is worse with bumping/contact to the anterior knee, stairs, twisting and squatting. They have tried Ibuprofen, compression, Ice and elevation.      The patient is seen by themselves.    Patient's past medical, surgical, social, and family histories were reviewed today and no changes are noted.    REVIEW OF SYSTEMS:  Constitutional: NEGATIVE for fever, chills, change in weight  Skin: NEGATIVE for worrisome rashes, moles or lesions  GI/: NEGATIVE for bowel or bladder changes  Neuro: NEGATIVE for weakness, dizziness or paresthesias    OBJECTIVE:  /66  Ht 5' 3\" (1.6 m)  Wt 163 lb (73.9 kg)  LMP " 11/29/2015 (Exact Date)  BMI 28.87 kg/m2   General: healthy, alert and in no distress  HEENT: no scleral icterus or conjunctival erythema  Skin: no suspicious lesions or rash. No jaundice.  CV: no pedal edema  Resp: normal respiratory effort without conversational dyspnea   Psych: normal mood and affect  Gait: Antalgic gait, appropriate coordination and balance  Neuro: normal light touch sensory exam of the extremities.    MSK:  RIGHT KNEE  Inspection:    normal alignment  Palpation:    Tender about the medial patellar facet. Remainder of bony and ligamentous landmarks are nontender.    No effusion is present    Patellofemoral crepitus is Present  Range of Motion:     00 extension to 1350 flexion  Strength:    Extensor mechanism intact  Special Tests:    Positive: Patellar grind    Negative:  Lachman's, Elsy's    Patient's conditions were thoroughly discussed during today's visit with greater than 50% of the visit spent counseling the patient with total time spent face-to-face with the patient being 20 minutes.    Altaf Purvis DO [facility]

## 2018-05-22 NOTE — PATIENT INSTRUCTIONS
1. Primary osteoarthritis of right knee      Discussed exam - pain appears to related to known arthritis/cartilage wearing behind your kneecap.  This will intermittently bother you.  Acute exacerbation due to hitting your kneecap  Stairs, squats, lunges, kneeling onto your knees will bother you. Elliptical should be ok. If you run, limit hills  Physical therapy: [facility] - [phone]. For KT tape.  If you decide to pursue cortisone injection double check with Optho  Turmeric can be used as an anti-inflammatory - either [place] Naturals (Curcurmin and Fish Oil) or Pure Encapsulations (Curcurmin Complex 3)    Follow up as needed.

## 2018-05-22 NOTE — LETTER
5/22/2018         RE: Katrin Fitzpatrick  [address] [place] [address]        Dear Colleague,    Thank you for referring your patient, Katrin Fitzpatrick, to the [facility]. Please see a copy of my visit note below.    ASSESSMENT & PLAN    1. Primary osteoarthritis of right knee      Discussed exam - pain related to known patellofemoral pathology   Acute exacerbation due to hitting your kneecap  Stairs, squats, lunges, kneeling onto your knees will bother you. Elliptical should be ok. If you run, limit hills  Physical therapy: [facility] - [phone]. For KT tape.  If you decide to pursue cortisone injection double check with Optho given upcoming surgery  Turmeric can be used as an anti-inflammatory - either [place] [employer] (Curcurmin and Fish Oil) or Pure Encapsulations (Curcurmin Complex 3)    Follow up as needed.    -----    SUBJECTIVE:  Katrin Fitzpatrick is a 48 year old female who is seen in follow-up for chronic right knee pain/osteoarthritis.They were last seen 1/30/2018 with a right knee intra-articular corticosteroid injection completed at that time.  Patient reports that she got great relief from the injection that lasted until recently.  Patient states the pain returned in her knee after hitting her anterior knee on a shred box at work approximately 3 weeks ago. Patient states that the pain in her right knee is anterior and anteromedial patellar facet.  She is following up today for further evaluation and recommendations for treatments.    They indicate that their current pain level is 5/10. Pain is worse with bumping/contact to the anterior knee, stairs, twisting and squatting. They have tried Ibuprofen, compression, Ice and elevation.      The patient is seen by themselves.    Patient's past medical, surgical, social, and family histories were reviewed today and no changes are noted.    REVIEW OF SYSTEMS:  Constitutional: NEGATIVE for fever, chills, change  "in weight  Skin: NEGATIVE for worrisome rashes, moles or lesions  GI/: NEGATIVE for bowel or bladder changes  Neuro: NEGATIVE for weakness, dizziness or paresthesias    OBJECTIVE:  /66  Ht 5' 3\" (1.6 m)  Wt 163 lb (73.9 kg)  LMP 11/29/2015 (Exact Date)  BMI 28.87 kg/m2   General: healthy, alert and in no distress  HEENT: no scleral icterus or conjunctival erythema  Skin: no suspicious lesions or rash. No jaundice.  CV: no pedal edema  Resp: normal respiratory effort without conversational dyspnea   Psych: normal mood and affect  Gait: Antalgic gait, appropriate coordination and balance  Neuro: normal light touch sensory exam of the extremities.    MSK:  RIGHT KNEE  Inspection:    normal alignment  Palpation:    Tender about the medial patellar facet. Remainder of bony and ligamentous landmarks are nontender.    No effusion is present    Patellofemoral crepitus is Present  Range of Motion:     00 extension to 1350 flexion  Strength:    Extensor mechanism intact  Special Tests:    Positive: Patellar grind    Negative:  Lachman's, Elsy's    Patient's conditions were thoroughly discussed during today's visit with greater than 50% of the visit spent counseling the patient with total time spent face-to-face with the patient being 20 minutes.    Altaf Purvis DO [facility]          Again, thank you for allowing me to participate in the care of your patient.        Sincerely,        Altaf Purvis, DO    "

## 2018-05-30 ENCOUNTER — THERAPY VISIT (OUTPATIENT)
Dept: PHYSICAL THERAPY | Facility: CLINIC | Age: 48
End: 2018-05-30
Payer: COMMERCIAL

## 2018-05-30 DIAGNOSIS — M25.561 RIGHT KNEE PAIN: Primary | ICD-10-CM

## 2018-05-30 PROCEDURE — 97161 PT EVAL LOW COMPLEX 20 MIN: CPT | Mod: GP | Performed by: PHYSICAL THERAPIST

## 2018-05-30 PROCEDURE — 97110 THERAPEUTIC EXERCISES: CPT | Mod: GP | Performed by: PHYSICAL THERAPIST

## 2018-05-30 ASSESSMENT — ACTIVITIES OF DAILY LIVING (ADL)
WEAKNESS: I DO NOT HAVE THE SYMPTOM
WALK: ACTIVITY IS MINIMALLY DIFFICULT
HOW_WOULD_YOU_RATE_THE_CURRENT_FUNCTION_OF_YOUR_KNEE_DURING_YOUR_USUAL_DAILY_ACTIVITIES_ON_A_SCALE_FROM_0_TO_100_WITH_100_BEING_YOUR_LEVEL_OF_KNEE_FUNCTION_PRIOR_TO_YOUR_INJURY_AND_0_BEING_THE_INABILITY_TO_PERFORM_ANY_OF_YOUR_USUAL_DAILY_ACTIVITIES?: 30
STIFFNESS: I DO NOT HAVE THE SYMPTOM
GIVING WAY, BUCKLING OR SHIFTING OF KNEE: I HAVE THE SYMPTOM BUT IT DOES NOT AFFECT MY ACTIVITY
KNEE_ACTIVITY_OF_DAILY_LIVING_SUM: 52
GO UP STAIRS: ACTIVITY IS SOMEWHAT DIFFICULT
RISE FROM A CHAIR: ACTIVITY IS MINIMALLY DIFFICULT
PAIN: THE SYMPTOM AFFECTS MY ACTIVITY SLIGHTLY
KNEEL ON THE FRONT OF YOUR KNEE: I AM UNABLE TO DO THE ACTIVITY
SQUAT: I AM UNABLE TO DO THE ACTIVITY
SWELLING: I DO NOT HAVE THE SYMPTOM
HOW_WOULD_YOU_RATE_THE_OVERALL_FUNCTION_OF_YOUR_KNEE_DURING_YOUR_USUAL_DAILY_ACTIVITIES?: NEARLY NORMAL
AS_A_RESULT_OF_YOUR_KNEE_INJURY,_HOW_WOULD_YOU_RATE_YOUR_CURRENT_LEVEL_OF_DAILY_ACTIVITY?: NEARLY NORMAL
RAW_SCORE: 52
STAND: ACTIVITY IS NOT DIFFICULT
SIT WITH YOUR KNEE BENT: ACTIVITY IS NOT DIFFICULT
LIMPING: I HAVE THE SYMPTOM BUT IT DOES NOT AFFECT MY ACTIVITY
GO DOWN STAIRS: ACTIVITY IS NOT DIFFICULT
KNEE_ACTIVITY_OF_DAILY_LIVING_SCORE: 74.29

## 2018-05-30 NOTE — PROGRESS NOTES
"[facility] Initial Evaluation  Subjective:  Patient is a 48 year old female presenting with rehab right knee hpi. The history is provided by the patient. No  was used.   Katrin Fitzpatrick is a 48 year old female with a right knee condition.  Condition occurred with:  A fall/slip.    This is a new condition  Patient reports multiple falling and bumping episodes over the past 2 years.  Patient c/o pain going up/down stairs and kneeling/squatting.  Patient would like to return to running.  Physical therapy was ordered on 5/22/2018.    Patient reports pain:  Anterior.    Pain is described as stabbing, aching and sharp  and reported as 4/10.  Associated symptoms:  Loss of strength. Pain is the same all the time.  Symptoms are exacerbated by ascending stairs, descending stairs, bending/squatting, kneeling, transfers and other (jumping) and relieved by rest.    Special tests:  X-ray and MRI (\"arthritis\").  Previous treatment includes other (Injection 1/2018).  There was significant improvement following previous treatment.  General health as reported by patient is excellent.  Pertinent medical history includes:  Anemia, history of fractures, migraines/headaches and overweight.  Medical allergies: yes.  Other surgeries include:  Other.  Current medications:  None as reported by the patient.  Current occupation is LPN.  Patient is working in normal job without restrictions.      Barriers include:  None as reported by the patient.    Red flags:  None as reported by the patient.                        Objective:  Standing Alignment:              Knee:  Patella aman L and patella aman R      Gait:    Gait Type:  Normal                                                      Hip Evaluation    Hip Strength:        Abduction:  Left: 5-/5    -   Pain:Right: 5-/5   -   Pain:                           Knee Evaluation:  ROM:  AROM: normal  PROM: normal            Strength:     Extension:  Right: " 5-/5    Pain:++  Flexion:  Right: 5/5    Pain:-      Quad Set Right: Good    Pain:  Ligament Testing:  Normal                    Edema:  Normal            General     ROS    Assessment/Plan:    Patient is a 48 year old female with right side knee complaints.    Patient has the following significant findings with corresponding treatment plan.                Diagnosis 1:  Traumatic PFPS  Pain -  self management, education and home program  Decreased strength - therapeutic exercise, therapeutic activities and home program  Impaired muscle performance - neuro re-education and home program  Decreased function - therapeutic activities and home program    Therapy Evaluation Codes:   1) History comprised of:   Personal factors that impact the plan of care:      None.    Comorbidity factors that impact the plan of care are:      None.     Medications impacting care: None.  2) Examination of Body Systems comprised of:   Body structures and functions that impact the plan of care:      Knee.   Activity limitations that impact the plan of care are:      Sports, Squatting/kneeling, Stairs and Working.  3) Clinical presentation characteristics are:   Stable/Uncomplicated.  4) Decision-Making    Low complexity using standardized patient assessment instrument and/or measureable assessment of functional outcome.  Cumulative Therapy Evaluation is: Low complexity.    Previous and current functional limitations:  (See Goal Flow Sheet for this information)    Short term and Long term goals: (See Goal Flow Sheet for this information)     Communication ability:  Patient appears to be able to clearly communicate and understand verbal and written communication and follow directions correctly.  Treatment Explanation - The following has been discussed with the patient:   RX ordered/plan of care  Anticipated outcomes  Possible risks and side effects  This patient would benefit from PT intervention to resume normal activities.   Rehab potential  is good.    Frequency:  1 X week, once daily  Duration:  for 8 weeks  Discharge Plan:  Achieve all LTG.  Independent in home treatment program.  Reach maximal therapeutic benefit.    Please refer to the daily flowsheet for treatment today, total treatment time and time spent performing 1:1 timed codes.

## 2018-06-04 ENCOUNTER — THERAPY VISIT (OUTPATIENT)
Dept: PHYSICAL THERAPY | Facility: CLINIC | Age: 48
End: 2018-06-04
Payer: COMMERCIAL

## 2018-06-04 DIAGNOSIS — M25.561 RIGHT KNEE PAIN: ICD-10-CM

## 2018-06-04 PROCEDURE — 97110 THERAPEUTIC EXERCISES: CPT | Mod: GP | Performed by: PHYSICAL THERAPIST

## 2018-06-04 PROCEDURE — 97112 NEUROMUSCULAR REEDUCATION: CPT | Mod: GP | Performed by: PHYSICAL THERAPIST

## 2018-06-05 ENCOUNTER — OFFICE VISIT (OUTPATIENT)
Dept: PEDIATRICS | Facility: CLINIC | Age: 48
End: 2018-06-05
Payer: COMMERCIAL

## 2018-06-05 VITALS
SYSTOLIC BLOOD PRESSURE: 109 MMHG | DIASTOLIC BLOOD PRESSURE: 70 MMHG | BODY MASS INDEX: 29.8 KG/M2 | WEIGHT: 168.2 LBS | HEART RATE: 56 BPM | OXYGEN SATURATION: 97 % | TEMPERATURE: 98.3 F | HEIGHT: 63 IN

## 2018-06-05 DIAGNOSIS — Z01.818 PREOP GENERAL PHYSICAL EXAM: Primary | ICD-10-CM

## 2018-06-05 DIAGNOSIS — Q14.2 COLOBOMA OF OPTIC DISC, RIGHT EYE: ICD-10-CM

## 2018-06-05 PROCEDURE — 99214 OFFICE O/P EST MOD 30 MIN: CPT | Performed by: NURSE PRACTITIONER

## 2018-06-05 RX ORDER — MULTIPLE VITAMINS W/ MINERALS TAB 9MG-400MCG
1 TAB ORAL DAILY
COMMUNITY
End: 2018-08-08

## 2018-06-05 NOTE — MR AVS SNAPSHOT
After Visit Summary   6/5/2018    Katrin Fitzpatrick    MRN: [id number]           Patient Information     Date Of Birth          1970        Visit Information        Provider Department      6/5/2018 9:00 AM Roselyn Delarosa APRN CNP [facility] [place]        Today's Diagnoses     Preop general physical exam    -  1    Coloboma of optic disc, right eye          Care Instructions      Before Your Surgery      Call your surgeon if there is any change in your health. This includes signs of a cold or flu (such as a sore throat, runny nose, cough, rash or fever).    Do not smoke, drink alcohol or take over the counter medicine (unless your surgeon or primary care doctor tells you to) for the 24 hours before and after surgery.    If you take prescribed drugs: Follow your doctor s orders about which medicines to take and which to stop until after surgery.    Eating and drinking prior to surgery: follow the instructions from your surgeon    Take a shower or bath the night before surgery. Use the soap your surgeon gave you to gently clean your skin. If you do not have soap from your surgeon, use your regular soap. Do not shave or scrub the surgery site.  Wear clean pajamas and have clean sheets on your bed.           Follow-ups after your visit        Follow-up notes from your care team     Return in about 1 year (around 6/5/2019) for Routine Visit.      Your next 10 appointments already scheduled     Jun 11, 2018  1:10 PM CDT   LIBBY De La Rosa with Namita Zheng PT   [facility] [place] (LIBBY [facility]  )    [address] [place] [address]   [phone]              Who to contact     If you have questions or need follow up information about today's clinic visit or your schedule please contact [facility] directly at [phone].  Normal or non-critical lab and imaging results will be communicated to you by MyChart, letter or phone within 4  "business days after the clinic has received the results. If you do not hear from us within 7 days, please contact the clinic through "[employer] or phone. If you have a critical or abnormal lab result, we will notify you by phone as soon as possible.  Submit refill requests through "[employer] or call your pharmacy and they will forward the refill request to us. Please allow 3 business days for your refill to be completed.          Additional Information About Your Visit        "[employer] Information     "[employer] gives you secure access to your electronic health record. If you see a primary care provider, you can also send messages to your care team and make appointments. If you have questions, please call your primary care clinic.  If you do not have a primary care provider, please call [phone] and they will assist you.        Care EveryWhere ID     This is your Care EveryWhere ID. This could be used by other organizations to access your [facility] medical records  [id number]        Your Vitals Were     Pulse Temperature Height Last Period Pulse Oximetry BMI (Body Mass Index)    56 98.3  F (36.8  C) (Oral) 5' 3\" (1.6 m) 11/29/2015 (Exact Date) 97% 29.8 kg/m2       Blood Pressure from Last 3 Encounters:   06/05/18 109/70   05/22/18 108/66   05/16/18 100/67    Weight from Last 3 Encounters:   06/05/18 168 lb 3.2 oz (76.3 kg)   05/22/18 163 lb (73.9 kg)   05/16/18 163 lb 1.6 oz (74 kg)              Today, you had the following     No orders found for display       Primary Care Provider Office Phone # Fax #    Roselyn Delarosa, YVES [phone] [address] [place] [address]        Equal Access to Services     [facility] : Ana Braun, tita kenney, gela masseyaljody hughes. So [facility] [phone].    ATENCIÓN: Si habla español, tiene a bentley disposición servicios gratuitos de asistencia lingüística. Llame al " [phone].    We comply with applicable federal civil rights laws and [place] laws. We do not discriminate on the basis of race, color, national origin, age, disability, sex, sexual orientation, or gender identity.            Thank you!     Thank you for choosing [facility]  for your care. Our goal is always to provide you with excellent care. Hearing back from our patients is one way we can continue to improve our services. Please take a few minutes to complete the written survey that you may receive in the mail after your visit with us. Thank you!             Your Updated Medication List - Protect others around you: Learn how to safely use, store and throw away your medicines at www.disposemymeds.org.          This list is accurate as of 6/5/18  9:30 AM.  Always use your most recent med list.                   Brand Name Dispense Instructions for use Diagnosis    FISH OIL OMEGA-3 PO           Multi-vitamin Tabs tablet      Take 1 tablet by mouth daily

## 2018-06-05 NOTE — PROGRESS NOTES
[facility]  [address]  [address]  [place] [address] [phone]  Dept: [phone]    PRE-OP EVALUATION:  Today's date: 2018    Katrin Fitzpatrick (: 1970) presents for pre-operative evaluation assessment as requested by Dr. Robert Davis.  She requires evaluation and anesthesia risk assessment prior to undergoing surgery/procedure for treatment of vitrectomy of right eye  .    Fax number for surgical facility: [phone]  Primary Physician: Roselyn Delarosa  Type of Anesthesia Anticipated: General    Patient has a Health Care Directive or Living Will:  NO    Preop Questions 2018   Who is doing your surgery? Robert Davis   What are you having done? Vitrectomy of right eye   Date of Surgery/Procedure: 2018   Facility or Hospital where procedure/surgery will be performed: [facility]   1.  Do you have a history of Heart attack, stroke, stent, coronary bypass surgery, or other heart surgery? No   2.  Do you ever have any pain or discomfort in your chest? No   3.  Do you have a history of  Heart Failure? No   4.   Are you troubled by shortness of breath when:  walking on a level surface, or up a slight hill, or at night? No   5.  Do you currently have a cold, bronchitis or other respiratory infection? No   6.  Do you have a cough, shortness of breath, or wheezing? No   7.  Do you sometimes get pains in the calves of your legs when you walk? YES - cramping when she's sitting for a long period of time. No hx of DVT   8. Do you or anyone in your family have previous history of blood clots? No   9.  Do you or does anyone in your family have a serious bleeding problem such as prolonged bleeding following surgeries or cuts? No   10. Have you ever had problems with anemia or been told to take iron pills? YES - >20 yrs ago   11. Have you had any abnormal blood loss such as black, tarry or bloody stools, or abnormal vaginal bleeding? No   12.  Have you ever had a blood transfusion? No   13. Have you or any of your relatives ever had problems with anesthesia? YES - son   14. Do you have sleep apnea, excessive snoring or daytime drowsiness? No   15. Do you have any prosthetic heart valves? No   16. Do you have prosthetic joints? No   17. Is there any chance that you may be pregnant? No         HPI:     HPI related to upcoming procedure: hx of coloboma of optic disc      See problem list for active medical problems.  Problems all longstanding and stable, except as noted/documented.  See ROS for pertinent symptoms related to these conditions.                                                                                                                                                          .    MEDICAL HISTORY:     Patient Active Problem List    Diagnosis Date Noted     Right knee pain 05/30/2018     Priority: Medium     Coloboma of optic disc, right eye 05/16/2018     Priority: Medium     Family history of malignant neoplasm of breast 05/16/2018     Priority: Medium     Mom dx'ed age 47, maternal aunt dx'ed age 35       CARDIOVASCULAR SCREENING; LDL GOAL LESS THAN 160 02/10/2010     Priority: Medium      Past Medical History:   Diagnosis Date     Gestational diabetes      Irregular heart beat      Past Surgical History:   Procedure Laterality Date     CYSTOSCOPY N/A 12/22/2015    Procedure: CYSTOSCOPY;  Surgeon: Mauri Resendiz MD;  Location:  OR     HC TOOTH EXTRACTION W/FORCEP       HYSTERECTOMY, PAP NO LONGER INDICATED       LAPAROSCOPIC HYSTERECTOMY TOTAL, SALPINGECTOMY BILATERAL Bilateral 12/22/2015    Procedure: LAPAROSCOPIC HYSTERECTOMY TOTAL, SALPINGECTOMY BILATERAL;  Surgeon: Mauri Resendiz MD;  Location:  OR     Current Outpatient Prescriptions   Medication Sig Dispense Refill     multivitamin, therapeutic with minerals (MULTI-VITAMIN) TABS tablet Take 1 tablet by mouth daily       Omega-3 Fatty Acids (FISH OIL OMEGA-3 PO)     "    OTC products: None, except as noted above, no recent use of OTC ASA, NSAIDS or Steroids and no use of herbal medications or other supplements    Allergies   Allergen Reactions     Biaxin [Clarithromycin] Nausea and Vomiting     Humulin N [Insulin, Isophane Human, Nph] Itching      Latex Allergy: NO    Social History   Substance Use Topics     Smoking status: Never Smoker     Smokeless tobacco: Never Used     Alcohol use Yes      Comment: four drinks twice per month     History   Drug Use No       REVIEW OF SYSTEMS:   CONSTITUTIONAL: NEGATIVE for fever, chills, change in weight  ENT/MOUTH: NEGATIVE for ear, mouth and throat problems  RESP: NEGATIVE for significant cough or SOB  CV: NEGATIVE for chest pain, palpitations or peripheral edema    EXAM:   /70  Pulse 56  Temp 98.3  F (36.8  C) (Oral)  Ht 5' 3\" (1.6 m)  Wt 168 lb 3.2 oz (76.3 kg)  LMP 11/29/2015 (Exact Date)  SpO2 97%  BMI 29.8 kg/m2  GENERAL APPEARANCE: healthy, alert and no distress  HENT: ear canals and TM's normal and nose and mouth without ulcers or lesions  RESP: lungs clear to auscultation - no rales, rhonchi or wheezes  CV: regular rate and rhythm, normal S1 S2, no S3 or S4 and no murmur, click or rub   ABDOMEN: soft, nontender, no HSM or masses and bowel sounds normal  NEURO: Normal strength and tone, sensory exam grossly normal, mentation intact and speech normal    DIAGNOSTICS:   No labs or EKG required for low risk surgery (cataract, skin procedure, breast biopsy, etc)    Recent Labs   Lab Test  07/03/17   1505  06/03/10   0628  06/01/10   2000   HGB   --   11.4*  11.3*   PLT   --   125*  161   CR   --   0.61  0.62   A1C  4.8   --    --         IMPRESSION:   Reason for surgery/procedure: preop  Diagnosis/reason for consult: coloboma    The proposed surgical procedure is considered LOW risk.    REVISED CARDIAC RISK INDEX  The patient has the following serious cardiovascular risks for perioperative complications such as (MI, PE, " VFib and 3  AV Block):  No serious cardiac risks  INTERPRETATION: 0 risks: Class I (very low risk - 0.4% complication rate)    The patient has the following additional risks for perioperative complications:  No identified additional risks      ICD-10-CM    1. Preop general physical exam Z01.818    2. Coloboma of optic disc, right eye Q14.2        RECOMMENDATIONS:     --Consult hospital rounder / IM to assist post-op medical management    --Patient is to take all scheduled medications on the day of surgery EXCEPT for modifications listed below.    APPROVAL GIVEN to proceed with proposed procedure, without further diagnostic evaluation       Signed Electronically by: YVES Guzman CNP    Copy of this evaluation report is provided to requesting physician.    [facility] Preop Guidelines    Revised Cardiac Risk Index

## 2018-06-11 ENCOUNTER — THERAPY VISIT (OUTPATIENT)
Dept: PHYSICAL THERAPY | Facility: CLINIC | Age: 48
End: 2018-06-11
Payer: COMMERCIAL

## 2018-06-11 DIAGNOSIS — M25.561 RIGHT KNEE PAIN: ICD-10-CM

## 2018-06-11 PROCEDURE — 97112 NEUROMUSCULAR REEDUCATION: CPT | Mod: GP | Performed by: PHYSICAL THERAPIST

## 2018-06-11 PROCEDURE — 97110 THERAPEUTIC EXERCISES: CPT | Mod: GP | Performed by: PHYSICAL THERAPIST

## 2018-08-08 ENCOUNTER — OFFICE VISIT (OUTPATIENT)
Dept: PEDIATRICS | Facility: CLINIC | Age: 48
End: 2018-08-08
Payer: COMMERCIAL

## 2018-08-08 VITALS
BODY MASS INDEX: 32.11 KG/M2 | SYSTOLIC BLOOD PRESSURE: 114 MMHG | HEIGHT: 63 IN | HEART RATE: 55 BPM | TEMPERATURE: 97.8 F | OXYGEN SATURATION: 98 % | DIASTOLIC BLOOD PRESSURE: 76 MMHG | WEIGHT: 181.2 LBS

## 2018-08-08 DIAGNOSIS — J06.9 VIRAL URI WITH COUGH: Primary | ICD-10-CM

## 2018-08-08 DIAGNOSIS — J30.1 ACUTE SEASONAL ALLERGIC RHINITIS DUE TO POLLEN: ICD-10-CM

## 2018-08-08 PROCEDURE — 99213 OFFICE O/P EST LOW 20 MIN: CPT | Performed by: PHYSICIAN ASSISTANT

## 2018-08-08 RX ORDER — CODEINE PHOSPHATE AND GUAIFENESIN 10; 100 MG/5ML; MG/5ML
1 SOLUTION ORAL
Qty: 60 ML | Refills: 0 | Status: SHIPPED | OUTPATIENT
Start: 2018-08-08 | End: 2019-02-27

## 2018-08-08 NOTE — PATIENT INSTRUCTIONS
Upper Respiratory Infection   (Common Cold)   Information About Your Condition:  Description  The common cold is an infection of the head and chest caused by a virus. It is a type of upper respiratory infection (URI). It can affect your nose, throat, sinuses, and ears. A cold can also affect your windpipe, voice box, and airways and the tube that connects your middle ear and throat.  Symptoms  You usually start having cold symptoms one to three days after contact with a cold virus. Symptoms may include one or more of the following:  scratchy or sore throat   sneezing, runny or stuffy nose   cough   watery eyes   ear congestion   slight fever (99 to 100  F, or 37.2 to 37.8  C)   tiredness   headache   loss of appetite.   Causes  Over 200 different viruses can cause colds. The infection spreads when viruses are passed to others by sneezing, coughing, or touching. You may also become infected by handling objects that were touched by someone with a cold.   You are more likely to get a cold if:   You are emotionally or physically stressed.   You are tired.   You are not eating enough healthy food.   You are a smoker.   You are living or working in crowded conditions.   People tend to get fewer colds as they get older because they build up immunity to some of the viruses that can cause colds.   What You Should Do At Home (Follow-up Care)   There are no medicines that cure a cold. You can treat your symptoms with over-the-counter medicines such as aspirin, acetaminophen, ibuprofen, naproxen, nose drops or sprays, cough syrups and drops, throat lozenges, and decongestants. Check with your provider before you take any of these drugs if you are already taking other medicines.   Get lots of rest.   As long as your healthcare provider has not told you differently, drink plenty of liquids. An average adult should drink at least 6 to 10 eight-ounce glasses of liquids that do not contain alcohol (including beer or  wine), such as water, juice, or weak tea each day. One way to tell if you are drinking enough liquid is to look at the color of your urine (pee). It should be very light yellow.   Using cool-mist humidifier to increase air moisture, especially in your bedroom, may make it easier to breathe. Be sure to clean the humidifier often so that it does not grow mold or bacteria.   Use nose drops to relieve nasal congestion. You can buy nose drops or make your own. To make a solution for nose drops, add one teaspoon of salt to a quart of water.   Wash your hands often with soap and warm water for at least 15 seconds to help keep your cold from spreading to others.   Avoid close contact with others for a few days.   Cover your nose and mouth with a tissue when you cough or sneeze. Throw the tissue away in the nearest waste receptacle. If you don t have a tissue, cough into the bend of your elbow.   If you smoke, stop. If someone else in your household smokes, ask them to smoke outside. Avoid exposure to secondhand smoke. Also avoid being outdoors during high-pollution advisories.   Please keep all medicines out of the reach of children.   What You Can Do Stay Healthy  Avoid close contact with people who have a cold.   Keep your hands away from your nose and mouth.   Wash your hands often with warm water and soap for at least 15 seconds, especially during peak cold and flu season. You can also carry an alcohol-based hand  with you to clean your hands when soap and water aren t available.   Eat healthy foods, especially fruits with vitamin C, such as oranges.   Get 7 to 8 hours of sleep.   Do not smoke and avoid secondhand smoke or being outdoors during high-pollution alerts.   Keep your immunizations up to date. Get a pneumonia vaccine if you have a chronic illness or are 65 years of age or older. Get a flu shot every year in the fall.   Call Your Healthcare Provider Right Away Or Return To The Emergency Department  If:  You have trouble breathing not caused by nasal stuffiness.   You are not able to swallow your saliva.   You have a cough that gets worse or becomes painful.   You are coughing up yellowish or greenish phlegm (mucus).   The phlegm you are coughing up has streaks of blood.   You have a temperature of 101.5  F (38.6  C) or higher that lasts more than two days.   You have shaking chills.   You have a headache that lasts several days.   You have bluish, pale, or grayish lips, skin, or nails.   You have any symptoms that worry you.

## 2018-08-08 NOTE — MR AVS SNAPSHOT
After Visit Summary   8/8/2018    Katrin Fitzpatrick    MRN: [id number]           Patient Information     Date Of Birth          1970        Visit Information        Provider Department      8/8/2018 2:30 PM Cammie Knight PA-C [facility]        Today's Diagnoses     Viral URI with cough    -  1    Acute seasonal allergic rhinitis due to pollen          Care Instructions                   Upper Respiratory Infection   (Common Cold)   Information About Your Condition:  Description  The common cold is an infection of the head and chest caused by a virus. It is a type of upper respiratory infection (URI). It can affect your nose, throat, sinuses, and ears. A cold can also affect your windpipe, voice box, and airways and the tube that connects your middle ear and throat.  Symptoms  You usually start having cold symptoms one to three days after contact with a cold virus. Symptoms may include one or more of the following:  scratchy or sore throat   sneezing, runny or stuffy nose   cough   watery eyes   ear congestion   slight fever (99 to 100  F, or 37.2 to 37.8  C)   tiredness   headache   loss of appetite.   Causes  Over 200 different viruses can cause colds. The infection spreads when viruses are passed to others by sneezing, coughing, or touching. You may also become infected by handling objects that were touched by someone with a cold.   You are more likely to get a cold if:   You are emotionally or physically stressed.   You are tired.   You are not eating enough healthy food.   You are a smoker.   You are living or working in crowded conditions.   People tend to get fewer colds as they get older because they build up immunity to some of the viruses that can cause colds.   What You Should Do At Home (Follow-up Care)   There are no medicines that cure a cold. You can treat your symptoms with over-the-counter medicines such as aspirin, acetaminophen, ibuprofen, naproxen, nose  drops or sprays, cough syrups and drops, throat lozenges, and decongestants. Check with your provider before you take any of these drugs if you are already taking other medicines.   Get lots of rest.   As long as your healthcare provider has not told you differently, drink plenty of liquids. An average adult should drink at least 6 to 10 eight-ounce glasses of liquids that do not contain alcohol (including beer or wine), such as water, juice, or weak tea each day. One way to tell if you are drinking enough liquid is to look at the color of your urine (pee). It should be very light yellow.   Using cool-mist humidifier to increase air moisture, especially in your bedroom, may make it easier to breathe. Be sure to clean the humidifier often so that it does not grow mold or bacteria.   Use nose drops to relieve nasal congestion. You can buy nose drops or make your own. To make a solution for nose drops, add one teaspoon of salt to a quart of water.   Wash your hands often with soap and warm water for at least 15 seconds to help keep your cold from spreading to others.   Avoid close contact with others for a few days.   Cover your nose and mouth with a tissue when you cough or sneeze. Throw the tissue away in the nearest waste receptacle. If you don t have a tissue, cough into the bend of your elbow.   If you smoke, stop. If someone else in your household smokes, ask them to smoke outside. Avoid exposure to secondhand smoke. Also avoid being outdoors during high-pollution advisories.   Please keep all medicines out of the reach of children.   What You Can Do Stay Healthy  Avoid close contact with people who have a cold.   Keep your hands away from your nose and mouth.   Wash your hands often with warm water and soap for at least 15 seconds, especially during peak cold and flu season. You can also carry an alcohol-based hand  with you to clean your hands when soap and water aren t available.   Eat healthy foods,  especially fruits with vitamin C, such as oranges.   Get 7 to 8 hours of sleep.   Do not smoke and avoid secondhand smoke or being outdoors during high-pollution alerts.   Keep your immunizations up to date. Get a pneumonia vaccine if you have a chronic illness or are 65 years of age or older. Get a flu shot every year in the fall.   Call Your Healthcare Provider Right Away Or Return To The Emergency Department If:  You have trouble breathing not caused by nasal stuffiness.   You are not able to swallow your saliva.   You have a cough that gets worse or becomes painful.   You are coughing up yellowish or greenish phlegm (mucus).   The phlegm you are coughing up has streaks of blood.   You have a temperature of 101.5  F (38.6  C) or higher that lasts more than two days.   You have shaking chills.   You have a headache that lasts several days.   You have bluish, pale, or grayish lips, skin, or nails.   You have any symptoms that worry you.            Follow-ups after your visit        Follow-up notes from your care team     Return in about 1 week (around 8/15/2018), or if symptoms worsen or fail to improve.      Who to contact     If you have questions or need follow up information about today's clinic visit or your schedule please contact [facility] directly at [phone].  Normal or non-critical lab and imaging results will be communicated to you by Garlikhart, letter or phone within 4 business days after the clinic has received the results. If you do not hear from us within 7 days, please contact the clinic through Garlikhart or phone. If you have a critical or abnormal lab result, we will notify you by phone as soon as possible.  Submit refill requests through [facility] or call your pharmacy and they will forward the refill request to us. Please allow 3 business days for your refill to be completed.          Additional Information About Your Visit        [facility] Information     [facility] gives you secure  "access to your electronic health record. If you see a primary care provider, you can also send messages to your care team and make appointments. If you have questions, please call your primary care clinic.  If you do not have a primary care provider, please call [phone] and they will assist you.        Care EveryWhere ID     This is your Care EveryWhere ID. This could be used by other organizations to access your [place] medical records  [id number]        Your Vitals Were     Pulse Temperature Height Last Period Pulse Oximetry BMI (Body Mass Index)    55 97.8  F (36.6  C) (Tympanic) 5' 3\" (1.6 m) 11/29/2015 (Exact Date) 98% 32.1 kg/m2       Blood Pressure from Last 3 Encounters:   08/08/18 114/76   06/05/18 109/70   05/22/18 108/66    Weight from Last 3 Encounters:   08/08/18 181 lb 3.2 oz (82.2 kg)   06/05/18 168 lb 3.2 oz (76.3 kg)   05/22/18 163 lb (73.9 kg)              Today, you had the following     No orders found for display         Today's Medication Changes          These changes are accurate as of 8/8/18  3:16 PM.  If you have any questions, ask your nurse or doctor.               Start taking these medicines.        Dose/Directions    guaiFENesin-codeine 100-10 MG/5ML Soln solution   Commonly known as:  ROBITUSSIN AC   Used for:  Viral URI with cough   Started by:  Cammie Knight PA-C        Dose:  1 tsp.   Take 5 mLs by mouth nightly as needed for cough   Quantity:  60 mL   Refills:  0            Where to get your medicines      Some of these will need a paper prescription and others can be bought over the counter.  Ask your nurse if you have questions.     Bring a paper prescription for each of these medications     guaiFENesin-codeine 100-10 MG/5ML Soln solution                Primary Care Provider Office Phone # Fax #    YVES Almazan [phone] [address] [place] [id number]        Equal Access to Services     YVONNE FULTON AH: " Hadii aad ku hadkjstella Somendozaali, waaxda luqadaha, qaybta kaalmada bobbi, jody adamin hayaapete robertohusam moreno lapanfilopete taye. So [facility] [phone].    ATENCIÓN: Si habla suresh, tiene a bentley disposición servicios gratuitos de asistencia lingüística. Llame al [phone].    We comply with applicable federal civil rights laws and [place] laws. We do not discriminate on the basis of race, color, national origin, age, disability, sex, sexual orientation, or gender identity.            Thank you!     Thank you for choosing [facility] [place]  for your care. Our goal is always to provide you with excellent care. Hearing back from our patients is one way we can continue to improve our services. Please take a few minutes to complete the written survey that you may receive in the mail after your visit with us. Thank you!             Your Updated Medication List - Protect others around you: Learn how to safely use, store and throw away your medicines at www.disposemymeds.org.          This list is accurate as of 8/8/18  3:16 PM.  Always use your most recent med list.                   Brand Name Dispense Instructions for use Diagnosis    guaiFENesin-codeine 100-10 MG/5ML Soln solution    ROBITUSSIN AC    60 mL    Take 5 mLs by mouth nightly as needed for cough    Viral URI with cough

## 2018-08-08 NOTE — PROGRESS NOTES
"  SUBJECTIVE:   Katrin Fitzpatrick is a 48 year old female who presents to clinic today for the following health issues:    Acute Illness   Acute illness concerns: URI  Onset: 5 days    Fever: no    Chills/Sweats: no    Headache (location?): YES- frontal    Sinus Pressure:YES- post-nasal drainage and facial pain    Conjunctivitis:  no    Ear Pain: YES: both    Rhinorrhea: YES- clear    Congestion: YES- chest    Sore Throat: YES- due to cough     Cough: YES-non-productive, productive of green sputum    Wheeze: no    Decreased Appetite: YES    Nausea: YES    Vomiting: no    Diarrhea:  no    Dysuria/Freq.: no    Fatigue/Achiness: YES- both    Sick/Strep Exposure: YES- work     Therapies Tried and outcome: nyquil, dayquil    Nurse at clinic.   Nonsmoker.   No history of asthma.seasonal allergies.     ROS:  ROS otherwise negative    OBJECTIVE:                                                    /76 (BP Location: Right arm, Cuff Size: Adult Regular)  Pulse 55  Temp 97.8  F (36.6  C) (Tympanic)  Ht 5' 3\" (1.6 m)  Wt 181 lb 3.2 oz (82.2 kg)  LMP 11/29/2015 (Exact Date)  SpO2 98%  BMI 32.1 kg/m2  Body mass index is 32.1 kg/(m^2).   GENERAL: alert, no distress  HENT: ear canals- normal; TMs- normal; Nose- normal; Mouth- no ulcers, no lesions  NECK: no tenderness, no adenopathy  RESP: diminished breath sounds throughout; no wheezing, rhonchi ,rales  CV: regular rates and rhythm, normal S1 S2, no S3 or S4 and no murmur, no click or rub    Diagnostic test results:  No results found for this or any previous visit (from the past 24 hour(s)).     ASSESSMENT/PLAN:                                                    (J06.9,  B97.89) Viral URI with cough  (primary encounter diagnosis)  Comment: may take robitussin AC at bedtime PRN  Plan: guaiFENesin-codeine (ROBITUSSIN AC) 100-10         MG/5ML SOLN solution          (J30.1) Acute seasonal allergic rhinitis due to pollen  Comment: begin antihistamines.   Plan:     Cammie " Tanna Knight PA-C  [facility]3]

## 2019-02-04 PROBLEM — M25.561 RIGHT KNEE PAIN: Status: RESOLVED | Noted: 2018-05-30 | Resolved: 2019-02-04

## 2019-02-04 NOTE — PROGRESS NOTES
Discharge Note    Progress reporting period is from initial eval to Jun 11, 2018.     Katrin failed to return for next follow up visit and current status is unknown.  Please see information below for last relevant information on current status.  Patient seen for 3 visits.  SUBJECTIVE  Subjective changes noted by patient:  States her R knee hurt over the weekend because they were busy at work. Exercises are going well, they don't cause pain.  .  Current pain level is 0/10(no pain, pressure today).     Previous pain level was  4/10.   Changes in function:  Yes (See Goal flowsheet attached for changes in current functional level)  Adverse reaction to treatment or activity: None    OBJECTIVE  Changes noted in objective findings: Side Plank L: 60 sec, R: 60 sec     ASSESSMENT/PLAN  Diagnosis: R Knee   DIAGP:  The encounter diagnosis was Right knee pain.  Updated problem list and treatment plan:   Pain - HEP  Decreased function - HEP  STG/LTGs have been met or progress has been made towards goals:  Yes, please see goal flowsheet for most current information  Assessment of Progress: current status is unknown.    Last current status: Pt is progressing slower than anticipated   Self Management Plans:  HEP  I have re-evaluated this patient and find that the nature, scope, duration and intensity of the therapy is appropriate for the medical condition of the patient.  Katrin continues to require the following intervention to meet STG and LTG's:  HEP.    Recommendations:  Discharge with current home program.  Patient to follow up with MD as needed.    Please refer to the daily flowsheet for treatment today, total treatment time and time spent performing 1:1 timed codes.

## 2019-02-27 ENCOUNTER — OFFICE VISIT (OUTPATIENT)
Dept: PEDIATRICS | Facility: CLINIC | Age: 49
End: 2019-02-27
Payer: COMMERCIAL

## 2019-02-27 ENCOUNTER — TRANSFERRED RECORDS (OUTPATIENT)
Dept: HEALTH INFORMATION MANAGEMENT | Facility: CLINIC | Age: 49
End: 2019-02-27

## 2019-02-27 VITALS
BODY MASS INDEX: 29.13 KG/M2 | HEIGHT: 63 IN | SYSTOLIC BLOOD PRESSURE: 107 MMHG | WEIGHT: 164.4 LBS | DIASTOLIC BLOOD PRESSURE: 67 MMHG | OXYGEN SATURATION: 99 % | HEART RATE: 66 BPM | TEMPERATURE: 96.6 F

## 2019-02-27 DIAGNOSIS — R00.2 PALPITATIONS: Primary | ICD-10-CM

## 2019-02-27 LAB
ANION GAP SERPL CALCULATED.3IONS-SCNC: 5 MMOL/L (ref 3–14)
BUN SERPL-MCNC: 7 MG/DL (ref 7–30)
CALCIUM SERPL-MCNC: 9 MG/DL (ref 8.5–10.1)
CHLORIDE SERPL-SCNC: 108 MMOL/L (ref 94–109)
CO2 SERPL-SCNC: 27 MMOL/L (ref 20–32)
CREAT SERPL-MCNC: 0.71 MG/DL (ref 0.52–1.04)
ERYTHROCYTE [DISTWIDTH] IN BLOOD BY AUTOMATED COUNT: 11.7 % (ref 10–15)
GFR SERPL CREATININE-BSD FRML MDRD: >90 ML/MIN/{1.73_M2}
GLUCOSE SERPL-MCNC: 89 MG/DL (ref 70–99)
HCT VFR BLD AUTO: 41.4 % (ref 35–47)
HGB BLD-MCNC: 14.1 G/DL (ref 11.7–15.7)
MAGNESIUM SERPL-MCNC: 2.1 MG/DL (ref 1.6–2.3)
MCH RBC QN AUTO: 32.7 PG (ref 26.5–33)
MCHC RBC AUTO-ENTMCNC: 34.1 G/DL (ref 31.5–36.5)
MCV RBC AUTO: 96 FL (ref 78–100)
PHOSPHATE SERPL-MCNC: 2.3 MG/DL (ref 2.5–4.5)
PLATELET # BLD AUTO: 214 10E9/L (ref 150–450)
POTASSIUM SERPL-SCNC: 4.5 MMOL/L (ref 3.4–5.3)
RBC # BLD AUTO: 4.31 10E12/L (ref 3.8–5.2)
SODIUM SERPL-SCNC: 140 MMOL/L (ref 133–144)
TSH SERPL DL<=0.005 MIU/L-ACNC: 1.02 MU/L (ref 0.4–4)
WBC # BLD AUTO: 6.7 10E9/L (ref 4–11)

## 2019-02-27 PROCEDURE — 84443 ASSAY THYROID STIM HORMONE: CPT | Performed by: INTERNAL MEDICINE

## 2019-02-27 PROCEDURE — 80048 BASIC METABOLIC PNL TOTAL CA: CPT | Performed by: INTERNAL MEDICINE

## 2019-02-27 PROCEDURE — 85027 COMPLETE CBC AUTOMATED: CPT | Performed by: INTERNAL MEDICINE

## 2019-02-27 PROCEDURE — 99213 OFFICE O/P EST LOW 20 MIN: CPT | Mod: GE | Performed by: STUDENT IN AN ORGANIZED HEALTH CARE EDUCATION/TRAINING PROGRAM

## 2019-02-27 PROCEDURE — 84100 ASSAY OF PHOSPHORUS: CPT | Performed by: INTERNAL MEDICINE

## 2019-02-27 PROCEDURE — 36415 COLL VENOUS BLD VENIPUNCTURE: CPT | Performed by: INTERNAL MEDICINE

## 2019-02-27 PROCEDURE — 83735 ASSAY OF MAGNESIUM: CPT | Performed by: INTERNAL MEDICINE

## 2019-02-27 ASSESSMENT — MIFFLIN-ST. JEOR: SCORE: 1349.84

## 2019-02-27 NOTE — PATIENT INSTRUCTIONS
Thanks for coming in today! Here is what we discussed:       I think your problems are most likely due to an electrical issue in your heart. I would like you to get a zio patch  Which is like a holter monitor for 1 week. [facility] will call you to set up a time to set you up with this device.     Please go to the lab today to get basic blood work    We will call you with results.    I have also sent referral to cardiology - they will call you. Try to set up for after Zio Patch is done so that they have that data when you see them.

## 2019-02-27 NOTE — PROGRESS NOTES
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 Eclectic like she was going to \"crumple\" if she didn't stabilize her. No lightheadedness, dizziness, or tunnel vision. Did not feel like she was going to faint, has never fainted. This is a typical episode for her which tends to happen a few times per week. Been getting more frequent in last year. Started in mid to late 20s. Pregnancy  x3 did not change symptoms.      had the worst episode ever.  1 hour before worst episode ever, felt like she hit a wall. Hooked herself up to pulse ox which showed heart rate of 30 bmp.     The worst episode: Then felt like the energy was plunging out of her and knew she had to sit down. Doctor she was working with did EKGs, had her drink a bunch of water and eat some salt. BP was then 161/94.  HR in the 50s. An hour later was 141/90.Took 2 hours to get over this episode and it never takes her that long.     Overall feels more fatigued. Works out and walks dog 3-5 miles per day. Does lots of intense exercise. No chest pain with exercise. Feels really tingly all over in the middle and at the end of the workouts. Sometimes regulating her breathing helps the tingling.     Has palpitations constantly. Sometimes really strong and hard and fast even when she is sitting watching TV. Been going on for years. Gets winded going up steps even one flight of stairs. Feels like she has to take a lot of deep breaths.     Menses - none. Had hysterectomy in . Has cyclical pelvic pain she is thinking may be cyst?    Fam Hx  Second cousin  in his 20s and had sudden cardiac death. Father  in mid to late 50s of \"I think cardiac problems\" Mom  of (breast, melanoma, and uterine) cancer in early to mid 50s. Siblings are fine without heart problems. 3 kids: " oldest son(24) had chiari malformation. One son (10) had many hemangiomas (several on face, in rectum). Daughter (9) healthy. Paternal gma had an aneurysm (80s). Paternal gfa (40s)  during kidney surgery.  Maternal gfa heart attack (in 80s) and maternal gma had breast gr AAA (70s). Maternal aunt 4vCABG  several months later (under 50).       -------------------------------------    Problem list and histories reviewed & adjusted, as indicated.  Additional history: as documented    Patient Active Problem List   Diagnosis     CARDIOVASCULAR SCREENING; LDL GOAL LESS THAN 160     Coloboma of optic disc, right eye     Family history of malignant neoplasm of breast     Past Surgical History:   Procedure Laterality Date     CYSTOSCOPY N/A 2015    Procedure: CYSTOSCOPY;  Surgeon: Mauri Resendiz MD;  Location: [facility] OR     HC TOOTH EXTRACTION W/FORCEP       HYSTERECTOMY, PAP NO LONGER INDICATED       LAPAROSCOPIC HYSTERECTOMY TOTAL, SALPINGECTOMY BILATERAL Bilateral 2015    Procedure: LAPAROSCOPIC HYSTERECTOMY TOTAL, SALPINGECTOMY BILATERAL;  Surgeon: Mauri Resendiz MD;  Location: [facility] OR       Social History     Tobacco Use     Smoking status: Never Smoker     Smokeless tobacco: Never Used   Substance Use Topics     Alcohol use: Yes     Comment: four drinks twice per month     Family History   Problem Relation Age of Onset     Cancer Mother         Breast melonoma and uterine     Hypertension Maternal Grandmother      Hypertension Maternal Grandfather      Cerebrovascular Disease Paternal Grandmother      Circulatory Paternal Grandmother         abdominal aortic aneurysm     Cancer Paternal Grandfather         Colon cancer     Chiari Malformation Son      Cancer Maternal Aunt         Bladder cancer     Diabetes Maternal Uncle      Cancer Maternal Aunt         Breast cancer     C.A.D. Maternal Aunt      C.A.D. Maternal Uncle      C.A.D. Maternal Uncle            Reviewed and updated as needed this  "visit by clinical staff       Reviewed and updated as needed this visit by Provider         ROS:  Constitutional, HEENT, cardiovascular, pulmonary, gi and gu systems are negative, except as otherwise noted.    OBJECTIVE:     /67   Pulse 66   Temp 96.6  F (35.9  C) (Oral)   Ht 1.6 m (5' 3\")   Wt 74.6 kg (164 lb 6.4 oz)   LMP 11/29/2015 (Exact Date)   SpO2 99%   BMI 29.12 kg/m    Body mass index is 29.12 kg/m .  GENERAL: healthy, alert and no distress  EYES: Eyes grossly normal to inspection, PERRL and conjunctivae and sclerae normal  HENT: ear canals and TM's normal, nose and mouth without ulcers or lesions  NECK: no adenopathy, no asymmetry, masses, or scars and thyroid normal to palpation  RESP: lungs clear to auscultation - no rales, rhonchi or wheezes  CV: regular rate and rhythm, normal S1 S2, no S3 or S4, no murmur, click or rub, no peripheral edema and peripheral pulses strong  ABDOMEN: soft, nontender, no hepatosplenomegaly, no masses and bowel sounds normal  MS: no gross musculoskeletal defects noted, no edema  SKIN: no suspicious lesions or rashes    Diagnostic Test Results:  Results for orders placed or performed in visit on 02/27/19 (from the past 24 hour(s))   CBC with platelets   Result Value Ref Range    WBC 6.7 4.0 - 11.0 10e9/L    RBC Count 4.31 3.8 - 5.2 10e12/L    Hemoglobin 14.1 11.7 - 15.7 g/dL    Hematocrit 41.4 35.0 - 47.0 %    MCV 96 78 - 100 fl    MCH 32.7 26.5 - 33.0 pg    MCHC 34.1 31.5 - 36.5 g/dL    RDW 11.7 10.0 - 15.0 %    Platelet Count 214 150 - 450 10e9/L       ASSESSMENT/PLAN:     1. Palpitations  Overall, I am concerned for conduction abnormality leading to these \"episodes\" as well as her \"constant\" palpitations which can occur even when she is sitting watching TV. Today she is hemodynamically stable and not needing a higher level of care. However I am concerned that the episodes are becoming more frequent and more severe. I reviewed the EKGs from her workplace (and " sent for stat abstracting so they can get into our chart) - sinus rhythm with normal axis, normal intervals. Rate was 50 and 57 on the two EKGs. She also had a rhythm strip that appeared normal to me. Since the episodes happen 2-3x per week, will ask her to obtain zio patch for 7 days of monitoring. Basic labs as well as referral to cardiology.     I discussed with her that since things are worsening, I would advise stopping exercise when she gets tingly and pausing on stairs when she gets dyspnea. I do not think her symptoms are orthostatic but advised good hydration and salt intake as well.     - Zio Patch Holter Adult Pediatric Greater than 48 hrs; Future  - CBC with platelets  - Basic metabolic panel  (Ca, Cl, CO2, Creat, Gluc, K, Na, BUN)  - Magnesium  - Phosphorus  - TSH with free T4 reflex  - CARDIOLOGY EVAL ADULT REFERRAL    Patient Instructions   Thanks for coming in today! Here is what we discussed:       I think your problems are most likely due to an electrical issue in your heart. I would like you to get a zio patch  Which is like a holter monitor for 1 week. [facility] will call you to set up a time to set you up with this device.     Please go to the lab today to get basic blood work    We will call you with results.    I have also sent referral to cardiology - they will call you. Try to set up for after Zio Patch is done so that they have that data when you see them.       Ada Austin MD  [facility] [place]    -------------------------------------  Staff note:  I discussed this case in depth with Dr. Austin and agree with the key components of the history, assessment and plan.      Tati Swanson MD  Internal Medicine/Pediatrics

## 2019-02-28 ENCOUNTER — HOSPITAL ENCOUNTER (OUTPATIENT)
Dept: CARDIOLOGY | Facility: CLINIC | Age: 49
Discharge: HOME | End: 2019-02-28
Attending: INTERNAL MEDICINE | Admitting: INTERNAL MEDICINE
Payer: COMMERCIAL

## 2019-02-28 ENCOUNTER — TELEPHONE (OUTPATIENT)
Dept: PEDIATRICS | Facility: CLINIC | Age: 49
End: 2019-02-28

## 2019-02-28 DIAGNOSIS — R00.2 PALPITATIONS: ICD-10-CM

## 2019-02-28 PROCEDURE — 0296T ZIO PATCH HOLTER ADULT PEDIATRIC GREATER THAN 48 HRS: CPT

## 2019-02-28 PROCEDURE — 0298T ZIO PATCH HOLTER ADULT PEDIATRIC GREATER THAN 48 HRS: CPT | Performed by: INTERNAL MEDICINE

## 2019-02-28 NOTE — TELEPHONE ENCOUNTER
"Called patient to discuss labs which were mostly normal and not revealing. Also to  particularly to ask about habits that can cause low  Phos (refeeding syndrome from a binge/purge cycle, etc). Has been doing Noom diet which is like a weight watchers type thien. Has been doing bow-flex but denies binge/purge, laxative abuse, exercise binges, etc.     Discussed that labs are normal. She got zio patch today and it is not bothering her but \"I'm going to have to wear some turtlenecks\".     We will follow up after zio patch and cards appt.     Ada Austin MD  Internal Medicine - Pediatrics PGY3  P: [phone]    "

## 2019-03-01 ENCOUNTER — TRANSFERRED RECORDS (OUTPATIENT)
Dept: HEALTH INFORMATION MANAGEMENT | Facility: CLINIC | Age: 49
End: 2019-03-01

## 2019-04-02 NOTE — PROGRESS NOTES
CARDIOLOGY CONSULT    REASON FOR CONSULT: palpitations    PRIMARY CARE PHYSICIAN:  [facility]    HISTORY OF PRESENT ILLNESS:  49-year-old female with no cardiac history is seen for atrial tachycardia.     She reports a long history of palpitations dating back to when she was in her 20s.  This has gradually worsened over the years.  More recently she will feel palpitations multiple times per day, up to 30 seconds, but never longer.    She also has generalized fatigue and tiredness.  She has some lightheadedness, but no syncopal episodes.  She will often feel a tingling all over and also an intermittent cough.  She will use the elliptical machine almost daily, but feels very winded and tired within minutes of starting this.    She drinks about 2 caffeinated beverages per day.  She denies any energy drinks, stimulants, or supplements.    6-day Zio patch March 2019 showed sinus rhythm, average heart rate 58, less than 1% ectopy, 46 runs of atrial tachycardia, longest 20 seconds, arrhythmia generally correlated with symptoms.     PAST MEDICAL HISTORY:  Past Medical History:   Diagnosis Date     Gestational diabetes      Irregular heart beat        MEDICATIONS:  No current outpatient medications on file.     No current facility-administered medications for this visit.        ALLERGIES:  Allergies   Allergen Reactions     Biaxin [Clarithromycin] Nausea and Vomiting     Humulin N [Insulin, Isophane Human, Nph] Itching       SOCIAL HISTORY:  I have reviewed this patient's social history and updated it with pertinent information if needed. Katrin CANDACE Fitzpatrick  reports that  has never smoked. she has never used smokeless tobacco. She reports that she drinks alcohol. She reports that she does not use drugs.    FAMILY HISTORY:  I have reviewed this patient's family history and updated it with pertinent information if needed.   Family History   Problem Relation Age of Onset     Cancer Mother         Breast melonoma and  uterine,  in early 50s     Heart Disease Father          in late 50s      Hypertension Maternal Grandmother         AAA and breast cancer     Hypertension Maternal Grandfather      Heart Disease Maternal Grandfather         MI in his 80s     Cerebrovascular Disease Paternal Grandmother      Circulatory Paternal Grandmother         aneurysm,  in 80s     Cancer Paternal Grandfather          during kidney surgery in his 40s     Chiari Malformation Son      Cancer Maternal Aunt         Bladder cancer     Diabetes Maternal Uncle      Cancer Maternal Aunt         Breast cancer, 4v CABG     Heart Disease Maternal Aunt      C.A.D. Maternal Aunt      C.A.D. Maternal Uncle      C.A.D. Maternal Uncle      Heart Disease Cousin         sudden cardiac death     Hemangiomas Son      No Known Problems Daughter        REVIEW OF SYSTEMS:  Constitutional:  No weight loss, fever, chills, weakness or fatigue.  HEENT:  Eyes:  No visual loss, blurred vision, double vision or yellow sclerae. No hearing loss, sneezing, congestion, runny nose or sore throat.  Skin:  No rash or itching.  Cardiovascular: per HPI  Respiratory: per HPI  GI:  No anorexia, nausea, vomiting or diarrhea. No abdominal pain or blood.  :  No dysurea, hematuria  Neurologic:  No headache, dizziness, syncope, paralysis, ataxia, numbness or tingling in the extremities. No change in bowel or bladder control.  Musculoskeletal:  No muscle, back pain, joint pain or stiffness.  Hematologic:  No anemia, bleeding or bruising.  Lymphatics:  No enlarged nodes. No history of splenectomy.  Psychiatric:  No history of depression or anxiety.  Endocrine:  No reports of sweating, cold or heat intolerance. No polyuria or polydipsia.  Allergies:  No history of asthma, hives, eczema or rhinitis.    PHYSICAL EXAM:  /70 (BP Location: Right arm, Patient Position: Chair, Cuff Size: Adult Regular)   Pulse 72   Wt 72.1 kg (159 lb)   LMP 2015 (Exact Date)   SpO2  99%   BMI 28.17 kg/m    Constitutional: awake, alert, no distress  Eyes: PERRL, sclera nonicteric  ENT: trachea midline  Respiratory: Lungs clear  Cardiovascular: Regular rate and rhythm, no murmurs  GI: nondistended, nontender, bowel sounds present  Lymph/Hematologic: no lymphadenopathy  Skin: dry, no rash  Musculoskeletal: good muscle tone, strength 5/5 in upper and lower extremities  Neurologic: no focal deficits  Neuropsychiatric: appropriate affact    DATA:  Labs:   May 2018: Cholesterol 188, HDL 45, , triglycerides 122     February 2019: Creatinine 0.7     EKG, February 27, 2019: Sinus rhythm, rate 57, single PVC     ASSESSMENT:  49-year-old female seen for atrial tachycardia.  Her palpitations correlate well with the atrial tachycardia on her Zio monitor.  Episodes are not very sustained, but are quite frequent.  She does have heart rates into the 40s and 50s at times, which is probably normal for her age.  Her generalized fatigue and tiredness would not necessarily be explained by the atrial tachycardia however.    We talked about the treatment options of atrial tachycardia.  She does run a lower heart rate and blood pressure, making rate controlling medications a little touchy.  She is agreeable to a very low dose of metoprolol.  She will be referred to electrophysiology to discuss the option of catheter ablation.    RECOMMENDATIONS:  1.  Paroxysmal nonsustained atrial tachycardia  -Trial of Toprol 12.5 mg daily with careful monitoring of heart rate and blood pressure  -Refer to electrophysiology to discuss option of ablation  -Treadmill stress echo to assess heart rate response to exercise and any exercise-induced arrhythmias    Follow-up with general cardiology as needed.    Maximilian Shay MD  Cardiology - [facility]  Pager:  [phone]  Text Page  April 2, 2019

## 2019-04-03 ENCOUNTER — OFFICE VISIT (OUTPATIENT)
Dept: CARDIOLOGY | Facility: CLINIC | Age: 49
End: 2019-04-03
Attending: INTERNAL MEDICINE
Payer: COMMERCIAL

## 2019-04-03 VITALS
WEIGHT: 159 LBS | DIASTOLIC BLOOD PRESSURE: 70 MMHG | SYSTOLIC BLOOD PRESSURE: 100 MMHG | OXYGEN SATURATION: 99 % | HEART RATE: 72 BPM | BODY MASS INDEX: 28.17 KG/M2

## 2019-04-03 DIAGNOSIS — I47.19 ATRIAL TACHYCARDIA (H): Primary | ICD-10-CM

## 2019-04-03 DIAGNOSIS — R53.83 OTHER FATIGUE: ICD-10-CM

## 2019-04-03 PROCEDURE — 99204 OFFICE O/P NEW MOD 45 MIN: CPT | Performed by: INTERNAL MEDICINE

## 2019-04-03 RX ORDER — METOPROLOL SUCCINATE 25 MG/1
12.5 TABLET, EXTENDED RELEASE ORAL DAILY
Qty: 30 TABLET | Refills: 3 | Status: SHIPPED | OUTPATIENT
Start: 2019-04-03

## 2019-04-03 NOTE — LETTER
4/3/2019    [facility]  [address] [address]    RE: Katrin Fitzpatrick       Dear Colleague,    I had the pleasure of seeing Katrin Hitchcockedilberto in the [facility].    CARDIOLOGY CONSULT    REASON FOR CONSULT: palpitations    PRIMARY CARE PHYSICIAN:  [facility]    HISTORY OF PRESENT ILLNESS:  49-year-old female with no cardiac history is seen for atrial tachycardia.     She reports a long history of palpitations dating back to when she was in her 20s.  This has gradually worsened over the years.  More recently she will feel palpitations multiple times per day, up to 30 seconds, but never longer.    She also has generalized fatigue and tiredness.  She has some lightheadedness, but no syncopal episodes.  She will often feel a tingling all over and also an intermittent cough.  She will use the elliptical machine almost daily, but feels very winded and tired within minutes of starting this.    She drinks about 2 caffeinated beverages per day.  She denies any energy drinks, stimulants, or supplements.    6-day Zio patch March 2019 showed sinus rhythm, average heart rate 58, less than 1% ectopy, 46 runs of atrial tachycardia, longest 20 seconds, arrhythmia generally correlated with symptoms.     PAST MEDICAL HISTORY:  Past Medical History:   Diagnosis Date     Gestational diabetes      Irregular heart beat        MEDICATIONS:  No current outpatient medications on file.     No current facility-administered medications for this visit.        ALLERGIES:  Allergies   Allergen Reactions     Biaxin [Clarithromycin] Nausea and Vomiting     Humulin N [Insulin, Isophane Human, Nph] Itching       SOCIAL HISTORY:  I have reviewed this patient's social history and updated it with pertinent information if needed. Katrin Fitzpatrick  reports that  has never smoked. she has never used smokeless tobacco. She reports that she drinks alcohol. She reports that she does not  use drugs.    FAMILY HISTORY:  I have reviewed this patient's family history and updated it with pertinent information if needed.   Family History   Problem Relation Age of Onset     Cancer Mother         Breast melonoma and uterine,  in early 50s     Heart Disease Father          in late 50s      Hypertension Maternal Grandmother         AAA and breast cancer     Hypertension Maternal Grandfather      Heart Disease Maternal Grandfather         MI in his 80s     Cerebrovascular Disease Paternal Grandmother      Circulatory Paternal Grandmother         aneurysm,  in 80s     Cancer Paternal Grandfather          during kidney surgery in his 40s     Chiari Malformation Son      Cancer Maternal Aunt         Bladder cancer     Diabetes Maternal Uncle      Cancer Maternal Aunt         Breast cancer, 4v CABG     Heart Disease Maternal Aunt      C.A.D. Maternal Aunt      C.A.D. Maternal Uncle      C.A.D. Maternal Uncle      Heart Disease Cousin         sudden cardiac death     Hemangiomas Son      No Known Problems Daughter        REVIEW OF SYSTEMS:  Constitutional:  No weight loss, fever, chills, weakness or fatigue.  HEENT:  Eyes:  No visual loss, blurred vision, double vision or yellow sclerae. No hearing loss, sneezing, congestion, runny nose or sore throat.  Skin:  No rash or itching.  Cardiovascular: per HPI  Respiratory: per HPI  GI:  No anorexia, nausea, vomiting or diarrhea. No abdominal pain or blood.  :  No dysurea, hematuria  Neurologic:  No headache, dizziness, syncope, paralysis, ataxia, numbness or tingling in the extremities. No change in bowel or bladder control.  Musculoskeletal:  No muscle, back pain, joint pain or stiffness.  Hematologic:  No anemia, bleeding or bruising.  Lymphatics:  No enlarged nodes. No history of splenectomy.  Psychiatric:  No history of depression or anxiety.  Endocrine:  No reports of sweating, cold or heat intolerance. No polyuria or polydipsia.  Allergies:  No  history of asthma, hives, eczema or rhinitis.    PHYSICAL EXAM:  /70 (BP Location: Right arm, Patient Position: Chair, Cuff Size: Adult Regular)   Pulse 72   Wt 72.1 kg (159 lb)   LMP 11/29/2015 (Exact Date)   SpO2 99%   BMI 28.17 kg/m     Constitutional: awake, alert, no distress  Eyes: PERRL, sclera nonicteric  ENT: trachea midline  Respiratory: Lungs clear  Cardiovascular: Regular rate and rhythm, no murmurs  GI: nondistended, nontender, bowel sounds present  Lymph/Hematologic: no lymphadenopathy  Skin: dry, no rash  Musculoskeletal: good muscle tone, strength 5/5 in upper and lower extremities  Neurologic: no focal deficits  Neuropsychiatric: appropriate affact    DATA:  Labs:   May 2018: Cholesterol 188, HDL 45, , triglycerides 122     February 2019: Creatinine 0.7     EKG, February 27, 2019: Sinus rhythm, rate 57, single PVC     ASSESSMENT:  49-year-old female seen for atrial tachycardia.  Her palpitations correlate well with the atrial tachycardia on her Zio monitor.  Episodes are not very sustained, but are quite frequent.  She does have heart rates into the 40s and 50s at times, which is probably normal for her age.  Her generalized fatigue and tiredness would not necessarily be explained by the atrial tachycardia however.    We talked about the treatment options of atrial tachycardia.  She does run a lower heart rate and blood pressure, making rate controlling medications a little touchy.  She is agreeable to a very low dose of metoprolol.  She will be referred to electrophysiology to discuss the option of catheter ablation.    RECOMMENDATIONS:  1.  Paroxysmal nonsustained atrial tachycardia  -Trial of Toprol 12.5 mg daily with careful monitoring of heart rate and blood pressure  -Refer to electrophysiology to discuss option of ablation  -Treadmill stress echo to assess heart rate response to exercise and any exercise-induced arrhythmias    Follow-up with general cardiology as  needed.    Maximilian Shay MD  Cardiology - [facility]  Pager:  [phone]  Text Page  April 2, 2019      Thank you for allowing me to participate in the care of your patient.      Sincerely,     Maximilian Shay MD     [facility]    cc:   Ada Austin MD  [address] [address]

## 2019-04-03 NOTE — PATIENT INSTRUCTIONS
Your Zio monitor showed that your baseline heart rhythm is sinus, which is normal.  Your average heart rate was 58, which is normal.    You had a few short episodes of a fast heart arrhythmia, called atrial tachycardia.  This happens when there is an excited spot in the upper chamber of your heart that starts generating some extra fast heartbeats.  It is nothing dangerous, but can cause symptoms and can be annoying.    There are 2 treatment options.  One is trying a medication which can decrease or slow down these extra heartbeats.  The other option is meeting with an electrophysiologist who performs a procedure called an ablation.  This is a procedure where a catheter is inserted through a vein in the leg and then the electrical activity of the heart is studied.  If they can find the excited spot, this can be treated with an ablation, which can be a permanent treatment option.    Treadmill stress echocardiogram  Will schedule a treadmill echo stress test.  This is a test where we take some baseline pictures of your heart with an ultrasound.  You will then exercise on the treadmill while hooked up to an EKG machine.  We monitored you heart rate and blood pressure.  We will have you exercise long enough to reach a certain target heart rate.  Immediately after exercise, additional pictures are taken of your heart with the ultrasound.    If there are any severe blockages in the heart, there may be changes on the EKG or the heart pictures may look abnormal.  If the test is abnormal, often a coronary angiogram is recommended at a later time.  This is the definitive test looking for blockages in the heart and potentially fixing them with stents.    Overall the test takes 30-45 minutes.  You will probably be on the treadmill for 5-12 minutes.  Wear some comfortable clothes and shoes for doing some light exercise.    Your test will be at [facility].    Start metoprolol (Toprol) 12.5mg once daily.  Check you  heart rate and blood pressure a few times per week.    Will schedule an appointment with electrophysiology at [facility].

## 2019-04-03 NOTE — LETTER
4/3/2019    Ada Austin MD  [facility]  [address] [place] [address]    RE: Katrin Fitzpatrick       Dear Colleague,    I had the pleasure of seeing Katrin CANDACE Fitzpatrick in the [facility].    CARDIOLOGY CONSULT    REASON FOR CONSULT: palpitations    PRIMARY CARE PHYSICIAN:  [facility]    HISTORY OF PRESENT ILLNESS:  49-year-old female with no cardiac history is seen for atrial tachycardia.     She reports a long history of palpitations dating back to when she was in her 20s.  This has gradually worsened over the years.  More recently she will feel palpitations multiple times per day, up to 30 seconds, but never longer.    She also has generalized fatigue and tiredness.  She has some lightheadedness, but no syncopal episodes.  She will often feel a tingling all over and also an intermittent cough.  She will use the elliptical machine almost daily, but feels very winded and tired within minutes of starting this.    She drinks about 2 caffeinated beverages per day.  She denies any energy drinks, stimulants, or supplements.    6-day Zio patch March 2019 showed sinus rhythm, average heart rate 58, less than 1% ectopy, 46 runs of atrial tachycardia, longest 20 seconds, arrhythmia generally correlated with symptoms.     PAST MEDICAL HISTORY:  Past Medical History:   Diagnosis Date     Gestational diabetes      Irregular heart beat        MEDICATIONS:  No current outpatient medications on file.     No current facility-administered medications for this visit.        ALLERGIES:  Allergies   Allergen Reactions     Biaxin [Clarithromycin] Nausea and Vomiting     Humulin N [Insulin, Isophane Human, Nph] Itching       SOCIAL HISTORY:  I have reviewed this patient's social history and updated it with pertinent information if needed. Katrin Fitzpatrick  reports that  has never smoked. she has never used smokeless tobacco. She reports that she drinks alcohol. She  reports that she does not use drugs.    FAMILY HISTORY:  I have reviewed this patient's family history and updated it with pertinent information if needed.   Family History   Problem Relation Age of Onset     Cancer Mother         Breast melonoma and uterine,  in early 50s     Heart Disease Father          in late 50s      Hypertension Maternal Grandmother         AAA and breast cancer     Hypertension Maternal Grandfather      Heart Disease Maternal Grandfather         MI in his 80s     Cerebrovascular Disease Paternal Grandmother      Circulatory Paternal Grandmother         aneurysm,  in 80s     Cancer Paternal Grandfather          during kidney surgery in his 40s     Chiari Malformation Son      Cancer Maternal Aunt         Bladder cancer     Diabetes Maternal Uncle      Cancer Maternal Aunt         Breast cancer, 4v CABG     Heart Disease Maternal Aunt      C.A.D. Maternal Aunt      C.A.D. Maternal Uncle      C.A.D. Maternal Uncle      Heart Disease Cousin         sudden cardiac death     Hemangiomas Son      No Known Problems Daughter        REVIEW OF SYSTEMS:  Constitutional:  No weight loss, fever, chills, weakness or fatigue.  HEENT:  Eyes:  No visual loss, blurred vision, double vision or yellow sclerae. No hearing loss, sneezing, congestion, runny nose or sore throat.  Skin:  No rash or itching.  Cardiovascular: per HPI  Respiratory: per HPI  GI:  No anorexia, nausea, vomiting or diarrhea. No abdominal pain or blood.  :  No dysurea, hematuria  Neurologic:  No headache, dizziness, syncope, paralysis, ataxia, numbness or tingling in the extremities. No change in bowel or bladder control.  Musculoskeletal:  No muscle, back pain, joint pain or stiffness.  Hematologic:  No anemia, bleeding or bruising.  Lymphatics:  No enlarged nodes. No history of splenectomy.  Psychiatric:  No history of depression or anxiety.  Endocrine:  No reports of sweating, cold or heat intolerance. No polyuria or  polydipsia.  Allergies:  No history of asthma, hives, eczema or rhinitis.    PHYSICAL EXAM:  /70 (BP Location: Right arm, Patient Position: Chair, Cuff Size: Adult Regular)   Pulse 72   Wt 72.1 kg (159 lb)   LMP 11/29/2015 (Exact Date)   SpO2 99%   BMI 28.17 kg/m     Constitutional: awake, alert, no distress  Eyes: PERRL, sclera nonicteric  ENT: trachea midline  Respiratory: Lungs clear  Cardiovascular: Regular rate and rhythm, no murmurs  GI: nondistended, nontender, bowel sounds present  Lymph/Hematologic: no lymphadenopathy  Skin: dry, no rash  Musculoskeletal: good muscle tone, strength 5/5 in upper and lower extremities  Neurologic: no focal deficits  Neuropsychiatric: appropriate affact    DATA:  Labs:   May 2018: Cholesterol 188, HDL 45, , triglycerides 122     February 2019: Creatinine 0.7     EKG, February 27, 2019: Sinus rhythm, rate 57, single PVC     ASSESSMENT:  49-year-old female seen for atrial tachycardia.  Her palpitations correlate well with the atrial tachycardia on her Zio monitor.  Episodes are not very sustained, but are quite frequent.  She does have heart rates into the 40s and 50s at times, which is probably normal for her age.  Her generalized fatigue and tiredness would not necessarily be explained by the atrial tachycardia however.    We talked about the treatment options of atrial tachycardia.  She does run a lower heart rate and blood pressure, making rate controlling medications a little touchy.  She is agreeable to a very low dose of metoprolol.  She will be referred to electrophysiology to discuss the option of catheter ablation.    RECOMMENDATIONS:  1.  Paroxysmal nonsustained atrial tachycardia  -Trial of Toprol 12.5 mg daily with careful monitoring of heart rate and blood pressure  -Refer to electrophysiology to discuss option of ablation  -Treadmill stress echo to assess heart rate response to exercise and any exercise-induced arrhythmias    Follow-up with  general cardiology as needed.    Maximilian Shay MD  Cardiology - [facility]  Pager:  [phone]  Text Page  April 2, 2019      Thank you for allowing me to participate in the care of your patient.    Sincerely,     Maximilian Shay MD     [facility]

## 2019-04-09 ENCOUNTER — TRANSFERRED RECORDS (OUTPATIENT)
Dept: HEALTH INFORMATION MANAGEMENT | Facility: CLINIC | Age: 49
End: 2019-04-09

## 2019-04-09 LAB
CREAT SERPL-MCNC: 0.8 MG/DL (ref 0.57–1.11)
GFR SERPL CREATININE-BSD FRML MDRD: >60 ML/MIN/1.73M2
GLUCOSE SERPL-MCNC: 95 MG/DL (ref 65–100)
POTASSIUM SERPL-SCNC: 3.9 MMOL/L (ref 3.5–5)

## 2019-04-10 ENCOUNTER — HOSPITAL ENCOUNTER (OUTPATIENT)
Dept: CARDIOLOGY | Facility: CLINIC | Age: 49
Discharge: HOME | End: 2019-04-10
Attending: INTERNAL MEDICINE | Admitting: INTERNAL MEDICINE
Payer: COMMERCIAL

## 2019-04-10 DIAGNOSIS — R53.83 OTHER FATIGUE: ICD-10-CM

## 2019-04-10 DIAGNOSIS — I47.19 ATRIAL TACHYCARDIA (H): ICD-10-CM

## 2019-04-10 PROCEDURE — 25500064 ZZH RX 255 OP 636: Performed by: INTERNAL MEDICINE

## 2019-04-10 PROCEDURE — 93016 CV STRESS TEST SUPVJ ONLY: CPT | Performed by: INTERNAL MEDICINE

## 2019-04-10 PROCEDURE — 93325 DOPPLER ECHO COLOR FLOW MAPG: CPT | Mod: 26 | Performed by: INTERNAL MEDICINE

## 2019-04-10 PROCEDURE — 93018 CV STRESS TEST I&R ONLY: CPT | Performed by: INTERNAL MEDICINE

## 2019-04-10 PROCEDURE — 93350 STRESS TTE ONLY: CPT | Mod: 26 | Performed by: INTERNAL MEDICINE

## 2019-04-10 PROCEDURE — 93321 DOPPLER ECHO F-UP/LMTD STD: CPT | Mod: 26 | Performed by: INTERNAL MEDICINE

## 2019-04-10 PROCEDURE — 40000264 ECHO STRESS ECHOCARDIOGRAM

## 2019-04-10 RX ADMIN — HUMAN ALBUMIN MICROSPHERES AND PERFLUTREN 4 ML: 10; .22 INJECTION, SOLUTION INTRAVENOUS at 13:40

## 2019-04-10 NOTE — PROGRESS NOTES
Stress echo completed. Contrast Optison used for image enhancement. The patient walked for 13:50 min on a standard Harvey Protocol reaching a HR of 155.

## 2019-04-11 NOTE — RESULT ENCOUNTER NOTE
"Per Dr. Shay, pt's \"stress echo is normal. F/u with EP as planned\".   Sent pt [employer] message w/ results.   Follow-up scheduled 4/23/19 w/ Dr. Arcos.   Chaim NOBLES"

## 2020-02-23 ENCOUNTER — HEALTH MAINTENANCE LETTER (OUTPATIENT)
Age: 50
End: 2020-02-23

## 2020-12-06 ENCOUNTER — HEALTH MAINTENANCE LETTER (OUTPATIENT)
Age: 50
End: 2020-12-06

## 2021-02-20 ENCOUNTER — HEALTH MAINTENANCE LETTER (OUTPATIENT)
Age: 51
End: 2021-02-20

## 2021-04-11 ENCOUNTER — HEALTH MAINTENANCE LETTER (OUTPATIENT)
Age: 51
End: 2021-04-11

## 2021-09-25 ENCOUNTER — HEALTH MAINTENANCE LETTER (OUTPATIENT)
Age: 51
End: 2021-09-25

## 2022-03-12 ENCOUNTER — HEALTH MAINTENANCE LETTER (OUTPATIENT)
Age: 52
End: 2022-03-12

## 2022-05-07 ENCOUNTER — HEALTH MAINTENANCE LETTER (OUTPATIENT)
Age: 52
End: 2022-05-07

## 2023-04-22 ENCOUNTER — HEALTH MAINTENANCE LETTER (OUTPATIENT)
Age: 53
End: 2023-04-22

## 2023-06-02 ENCOUNTER — HEALTH MAINTENANCE LETTER (OUTPATIENT)
Age: 53
End: 2023-06-02